# Patient Record
Sex: FEMALE | Race: WHITE | NOT HISPANIC OR LATINO | Employment: OTHER | URBAN - METROPOLITAN AREA
[De-identification: names, ages, dates, MRNs, and addresses within clinical notes are randomized per-mention and may not be internally consistent; named-entity substitution may affect disease eponyms.]

---

## 2023-05-08 DIAGNOSIS — N95.1 VASOMOTOR SYMPTOMS DUE TO MENOPAUSE: Primary | ICD-10-CM

## 2023-05-10 RX ORDER — ESTROGENS, CONJUGATED 0.3 MG/1
TABLET, FILM COATED ORAL
Qty: 15 TABLET | Refills: 1 | Status: SHIPPED | OUTPATIENT
Start: 2023-05-10 | End: 2024-01-07

## 2023-06-20 DIAGNOSIS — E78.5 HYPERLIPIDEMIA, UNSPECIFIED HYPERLIPIDEMIA TYPE: Primary | ICD-10-CM

## 2023-06-21 RX ORDER — ATORVASTATIN CALCIUM 20 MG/1
20 TABLET, FILM COATED ORAL NIGHTLY
Qty: 90 TABLET | Refills: 0 | Status: SHIPPED | OUTPATIENT
Start: 2023-06-21 | End: 2023-08-28 | Stop reason: DRUGHIGH

## 2023-06-27 DIAGNOSIS — E03.9 HYPOTHYROIDISM, UNSPECIFIED TYPE: Primary | ICD-10-CM

## 2023-06-28 RX ORDER — LEVOTHYROXINE SODIUM 100 UG/1
TABLET ORAL
Qty: 78 TABLET | Refills: 0 | Status: SHIPPED | OUTPATIENT
Start: 2023-06-28 | End: 2023-08-28 | Stop reason: ENTERED-IN-ERROR

## 2023-08-28 ENCOUNTER — OFFICE VISIT (OUTPATIENT)
Dept: PRIMARY CARE | Facility: CLINIC | Age: 83
End: 2023-08-28
Payer: MEDICARE

## 2023-08-28 VITALS
BODY MASS INDEX: 24.95 KG/M2 | OXYGEN SATURATION: 96 % | DIASTOLIC BLOOD PRESSURE: 80 MMHG | SYSTOLIC BLOOD PRESSURE: 134 MMHG | HEART RATE: 81 BPM | RESPIRATION RATE: 18 BRPM | HEIGHT: 62 IN | WEIGHT: 135.6 LBS

## 2023-08-28 DIAGNOSIS — H61.21 IMPACTED CERUMEN OF RIGHT EAR: ICD-10-CM

## 2023-08-28 DIAGNOSIS — Z78.0 MENOPAUSE: ICD-10-CM

## 2023-08-28 DIAGNOSIS — R13.10 DYSPHAGIA, UNSPECIFIED TYPE: ICD-10-CM

## 2023-08-28 DIAGNOSIS — M85.851 OSTEOPENIA OF RIGHT HIP: ICD-10-CM

## 2023-08-28 DIAGNOSIS — I10 HYPERTENSION, UNSPECIFIED TYPE: ICD-10-CM

## 2023-08-28 DIAGNOSIS — Z00.00 ENCOUNTER FOR ANNUAL WELLNESS EXAM IN MEDICARE PATIENT: Primary | ICD-10-CM

## 2023-08-28 DIAGNOSIS — N95.1 VASOMOTOR SYMPTOMS DUE TO MENOPAUSE: ICD-10-CM

## 2023-08-28 DIAGNOSIS — E03.9 HYPOTHYROIDISM, UNSPECIFIED TYPE: ICD-10-CM

## 2023-08-28 DIAGNOSIS — E78.5 HYPERLIPIDEMIA, UNSPECIFIED HYPERLIPIDEMIA TYPE: ICD-10-CM

## 2023-08-28 DIAGNOSIS — Z12.31 ENCOUNTER FOR SCREENING MAMMOGRAM FOR MALIGNANT NEOPLASM OF BREAST: ICD-10-CM

## 2023-08-28 PROCEDURE — G0439 PPPS, SUBSEQ VISIT: HCPCS | Performed by: SPECIALIST

## 2023-08-28 PROCEDURE — 1159F MED LIST DOCD IN RCRD: CPT | Performed by: SPECIALIST

## 2023-08-28 PROCEDURE — 99397 PER PM REEVAL EST PAT 65+ YR: CPT | Performed by: SPECIALIST

## 2023-08-28 PROCEDURE — 1036F TOBACCO NON-USER: CPT | Performed by: SPECIALIST

## 2023-08-28 PROCEDURE — 1125F AMNT PAIN NOTED PAIN PRSNT: CPT | Performed by: SPECIALIST

## 2023-08-28 PROCEDURE — 3079F DIAST BP 80-89 MM HG: CPT | Performed by: SPECIALIST

## 2023-08-28 PROCEDURE — 1157F ADVNC CARE PLAN IN RCRD: CPT | Performed by: SPECIALIST

## 2023-08-28 PROCEDURE — 1170F FXNL STATUS ASSESSED: CPT | Performed by: SPECIALIST

## 2023-08-28 PROCEDURE — 3075F SYST BP GE 130 - 139MM HG: CPT | Performed by: SPECIALIST

## 2023-08-28 PROCEDURE — 1160F RVW MEDS BY RX/DR IN RCRD: CPT | Performed by: SPECIALIST

## 2023-08-28 RX ORDER — ALENDRONATE SODIUM 70 MG/1
TABLET ORAL
COMMUNITY
Start: 2021-11-11 | End: 2023-11-28 | Stop reason: ALTCHOICE

## 2023-08-28 RX ORDER — VIT C/E/ZN/COPPR/LUTEIN/ZEAXAN 250MG-90MG
CAPSULE ORAL
COMMUNITY
Start: 2022-09-26 | End: 2023-11-28 | Stop reason: ALTCHOICE

## 2023-08-28 RX ORDER — LEVOTHYROXINE SODIUM 100 UG/1
100 TABLET ORAL SEE ADMIN INSTRUCTIONS
COMMUNITY
Start: 2016-12-13 | End: 2023-09-22 | Stop reason: DRUGHIGH

## 2023-08-28 RX ORDER — ACETAMINOPHEN 325 MG/1
TABLET ORAL EVERY 6 HOURS
COMMUNITY

## 2023-08-28 RX ORDER — ATORVASTATIN CALCIUM 20 MG/1
1 TABLET, FILM COATED ORAL NIGHTLY
COMMUNITY
Start: 2021-04-14 | End: 2023-12-04 | Stop reason: SDUPTHER

## 2023-08-28 RX ORDER — VIT A/VIT C/VIT E/ZINC/COPPER 2148-113
TABLET ORAL
COMMUNITY
Start: 2020-01-23

## 2023-08-28 RX ORDER — LISINOPRIL 10 MG/1
1 TABLET ORAL DAILY
COMMUNITY
Start: 2019-01-07 | End: 2023-10-28

## 2023-08-28 ASSESSMENT — ACTIVITIES OF DAILY LIVING (ADL)
GROCERY_SHOPPING: INDEPENDENT
MANAGING_FINANCES: INDEPENDENT
TAKING_MEDICATION: INDEPENDENT
BATHING: INDEPENDENT
DRESSING: INDEPENDENT
DOING_HOUSEWORK: INDEPENDENT

## 2023-08-28 ASSESSMENT — PATIENT HEALTH QUESTIONNAIRE - PHQ9
2. FEELING DOWN, DEPRESSED OR HOPELESS: NOT AT ALL
1. LITTLE INTEREST OR PLEASURE IN DOING THINGS: NOT AT ALL
SUM OF ALL RESPONSES TO PHQ9 QUESTIONS 1 AND 2: 0
1. LITTLE INTEREST OR PLEASURE IN DOING THINGS: NOT AT ALL
2. FEELING DOWN, DEPRESSED OR HOPELESS: NOT AT ALL
SUM OF ALL RESPONSES TO PHQ9 QUESTIONS 1 AND 2: 0

## 2023-08-28 ASSESSMENT — ENCOUNTER SYMPTOMS
OCCASIONAL FEELINGS OF UNSTEADINESS: 0
LOSS OF SENSATION IN FEET: 0
DEPRESSION: 0

## 2023-08-28 NOTE — PROGRESS NOTES
Subjective   Patient ID: Maryanne Gee is a 83 y.o. female who presents for Annual Exam.  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  Didn't go to ER.    Discussed, Last LDL 96, HDL 60 9/26/2022    Happy with new knees    Feet are bothering her, saw podiatry had xrays, arches are gone, has hammer toe on right and left and tendon rupture on bottom of right foot and calcified and left side of foot is numb and cannot move toes.  Has soft soles not much help, wearing Hoka sneakers which are very comfortable wants to avoid surgery (at [facility])    Gets ankle swelling some time fine in morning but by evening using compression hose some times.  Prior vein stripping    Now getting short of breath 2 flights of stairs no cp   New from last year, used to be able to walk up 6 flights now short of breath 2 flights.  Able to walk up one flight of stairs without PATTERSON.      Now living on 7th floor, walking on treadmill and does swimming does weights in water, only played golf once.  (Letitia Barraza was one of her foursome and Mara Cooper is no longer playing)    Not taking Alendronate weekly, is afraid of it, afraid of Osteonecrosis of the jaw and potential femur fracture.  Does not feel well on fosamax does not take every week started 11/11/2021 osteopenia on 7/2021 DXA, will repeat DXA, discussed risks/befefits of treatment, if worsening denisty could consider prolia but also risk of Osteonecrosis of Jaw    Episodic Dysphagia (feels she aspirates and coughs x 30 years rare)    Travelling to [place] and [place]     Allergies   Allergen Reactions  "   Tetanus And Diphtheria Toxoids Other     Arm swelling      Current Outpatient Medications   Medication Instructions    acetaminophen (TylenoL) 325 mg tablet oral, Every 6 hours    alendronate (Fosamax) 70 mg tablet oral    atorvastatin (Lipitor) 20 mg tablet 1 tablet, oral, Nightly, Mon Weds Friday and half tablet all other days    cholecalciferol (Vitamin D-3) 25 MCG (1000 UT) capsule oral    levothyroxine (SYNTHROID, LEVOXYL) 100 mcg, oral, See admin instructions, Takes one tablet MWF and half tablet all other days    lisinopril 10 mg tablet 1 tablet, oral, Daily    Premarin 0.3 mg tablet TAKE 1 TABLET BY MOUTH 1 DAY  WEEKLY    vitamins A,C,E-zinc-copper (PreserVision AREDS) 2,148 mcg-113 mg-45 mg-17.4mg tablet oral        Review of Systems  Constitutional  No fatigue, no fevers, no chills, no unintentional weight loss,   HEENT:  No headaches, no dizziness, Rare lightheadedness (less than once a month), macular degeneration has progressed, follows every 6 months, no double vision, no blurred vision, no hearing loss  Cardiovascular:  No chest pain, no palpitations, can have shortness of breath with exertion (one city block if walks too fast but not at her normal pace),   Respiratory:  No cough, no hemoptysis, no wheezing, No shortness of breath at rest  GI:  No odynophagia, no reflux, no abdominal pain, no nausea, no vomiting, no changes in bowel habits, no bright red blood per rectum, no melena  :  Positive episodic Dysphagia (feels she aspirates and coughs x 30 years rare) No urinary frequency, no dysuria, no urine incontinence nocturia 1-2x night with some urgency  MSK:  One fall, foot/ankle oint pain, no joint swelling  Neuro:  No tremors, no extremity weakness, lateral left foot numbness   Breasts:  No abnormalities on self breast exam no nipple discharge no skin changes    Physical Exam  /80   Pulse 81   Resp 18   Ht 1.575 m (5' 2\")   Wt 61.5 kg (135 lb 9.6 oz)   SpO2 96%   BMI 24.80 kg/m² "   General:    Well-appearing  F in no acute distress, well nourished, well hydrated  Head:  Normocephalic, atraumatic  Skin:          Warm dry,   Eyes:  Anicteric sclera, pupils equal,   Ears:        TMs intact on left, obscured by cerumen on right  Oral:      Not examined due to pandemic  Neck:   Supple, no cervical/supraclavicular adenopathy, no thyromegaly or nodules appreciated on exam  Cor:      Regular rate, normal S1, S2, no murmurs appreciated, no S3, no S4   Lungs:   Clear to auscultation b/l, no wheezes, no rhonchi, no crackles, no accessory respiratory muscle use  Abd:          Soft, nontender, no guarding, no rebound, no hepatosplenomegaly appreciated   Ext:            No lower extremity edema, no palpable cords  Pulses:      Pedal pulses intact  Neuro:   CN2-12 grossly intact   Breasts:     Not examined, declined    Assessment/Plan   Problem List Items Addressed This Visit       Encounter for annual wellness exam in Medicare patient - Primary     Continue annual influenza vaccine  Previoulsy received 2 covid vaccines 2 boosters and bivalent fall, 2022, plans covid vaccine this fall  Prior PPSV23 1/17/2019 and PCV 2016  Cannot do Td due to allergy  Recommend RSV vaccine   Previously received Shingrix vaccines at pharmacy  Prior HCV negative 8/29/2022  Labs ordered CMP CBC Fx Lipids TSH         Hypothyroidism     On Levothyroxine 100 mcgs daily  TSH 0.56 8/2022  Labs ordered TSH         Relevant Orders    Thyroid Stimulating Hormone    Hypertension     Home BP runs 120 or lower and diastolic under 80  Continue lisinopril 10 mg daily  Continue home blood pressure monitoring  Labs ordered CBC CMP           Relevant Orders    CBC    Comprehensive Metabolic Panel    Impacted cerumen of right ear     Referred to ENT         Relevant Orders    Referral to ENT    Dysphagia     Episodically feels as though she aspirates and coughs  Referred to ENT           Relevant Orders    Referral to ENT    Vasomotor symptoms  due to menopause     On Premarin         Hyperlipidemia     On Atorvastatin 20 mg daily 3 days per week and half tab all other days  LDL 96 9/26/2022  Labs ordered CMP Fx Lipids         Relevant Orders    Comprehensive Metabolic Panel    Lipid Panel    Osteopenia of right hip     Osteopenia on 7/19/2021 DXA  Not taking alendronate weekly since she does not feel well on it, initially ordered 11/11/2021, is afraid of Osteonecrosis of jaw and potential femur fracture  Bone density test ordered           Encounter for screening mammogram for malignant neoplasm of breast     Mammogram 9/8/2022, dense breast tissue  Mammogram ordered         Relevant Orders    BI mammo bilateral screening tomosynthesis    CBC    Comprehensive Metabolic Panel    Lipid Panel    Thyroid Stimulating Hormone    Menopause     Bone density test ordered  Osteopenia 11/11/2021 DXA           Relevant Orders    XR DEXA bone density     Follow-up 6 mos     Hortensia Pierre DO

## 2023-09-04 PROBLEM — M85.851 OSTEOPENIA OF RIGHT HIP: Status: ACTIVE | Noted: 2023-09-04

## 2023-09-04 PROBLEM — E03.9 HYPOTHYROIDISM: Status: ACTIVE | Noted: 2023-09-04

## 2023-09-04 PROBLEM — I10 HYPERTENSION: Status: ACTIVE | Noted: 2023-09-04

## 2023-09-04 PROBLEM — Z00.00 ENCOUNTER FOR ANNUAL WELLNESS EXAM IN MEDICARE PATIENT: Status: ACTIVE | Noted: 2023-09-04

## 2023-09-04 PROBLEM — Z12.31 ENCOUNTER FOR SCREENING MAMMOGRAM FOR MALIGNANT NEOPLASM OF BREAST: Status: ACTIVE | Noted: 2023-09-04

## 2023-09-04 PROBLEM — N95.1 VASOMOTOR SYMPTOMS DUE TO MENOPAUSE: Status: ACTIVE | Noted: 2023-09-04

## 2023-09-04 PROBLEM — H61.21 IMPACTED CERUMEN OF RIGHT EAR: Status: ACTIVE | Noted: 2023-09-04

## 2023-09-04 PROBLEM — R13.10 DYSPHAGIA: Status: ACTIVE | Noted: 2023-09-04

## 2023-09-04 PROBLEM — Z78.0 MENOPAUSE: Status: ACTIVE | Noted: 2023-09-04

## 2023-09-04 PROBLEM — M81.0 AGE-RELATED OSTEOPOROSIS WITHOUT CURRENT PATHOLOGICAL FRACTURE: Status: ACTIVE | Noted: 2023-09-04

## 2023-09-04 PROBLEM — E78.5 HYPERLIPIDEMIA: Status: ACTIVE | Noted: 2023-09-04

## 2023-09-04 NOTE — ASSESSMENT & PLAN NOTE
Osteopenia on 7/19/2021 DXA  Not taking alendronate weekly since she does not feel well on it, initially ordered 11/11/2021, is afraid of Osteonecrosis of jaw and potential femur fracture  Bone density test ordered

## 2023-09-04 NOTE — ASSESSMENT & PLAN NOTE
Continue annual influenza vaccine  Previoulsy received 2 covid vaccines 2 boosters and bivalent fall, 2022, plans covid vaccine this fall  Prior PPSV23 1/17/2019 and PCV 2016  Cannot do Td due to allergy  Recommend RSV vaccine   Previously received Shingrix vaccines at pharmacy  Prior HCV negative 8/29/2022  Labs ordered CMP CBC Fx Lipids TSH

## 2023-09-04 NOTE — ASSESSMENT & PLAN NOTE
On Atorvastatin 20 mg daily 3 days per week and half tab all other days  LDL 96 9/26/2022  Labs ordered CMP Fx Lipids

## 2023-09-04 NOTE — ASSESSMENT & PLAN NOTE
Home BP runs 120 or lower and diastolic under 80  Continue lisinopril 10 mg daily  Continue home blood pressure monitoring  Labs ordered CBC CMP

## 2023-09-13 ENCOUNTER — LAB (OUTPATIENT)
Dept: LAB | Facility: LAB | Age: 83
End: 2023-09-13
Payer: MEDICARE

## 2023-09-13 DIAGNOSIS — E78.5 HYPERLIPIDEMIA, UNSPECIFIED HYPERLIPIDEMIA TYPE: ICD-10-CM

## 2023-09-13 DIAGNOSIS — Z12.31 ENCOUNTER FOR SCREENING MAMMOGRAM FOR MALIGNANT NEOPLASM OF BREAST: ICD-10-CM

## 2023-09-13 DIAGNOSIS — I10 HYPERTENSION, UNSPECIFIED TYPE: ICD-10-CM

## 2023-09-13 DIAGNOSIS — E03.9 HYPOTHYROIDISM, UNSPECIFIED TYPE: ICD-10-CM

## 2023-09-13 PROCEDURE — 80053 COMPREHEN METABOLIC PANEL: CPT

## 2023-09-13 PROCEDURE — 85027 COMPLETE CBC AUTOMATED: CPT

## 2023-09-13 PROCEDURE — 84443 ASSAY THYROID STIM HORMONE: CPT

## 2023-09-13 PROCEDURE — 80061 LIPID PANEL: CPT

## 2023-09-13 PROCEDURE — 36415 COLL VENOUS BLD VENIPUNCTURE: CPT

## 2023-09-14 LAB
ALANINE AMINOTRANSFERASE (SGPT) (U/L) IN SER/PLAS: 14 U/L (ref 7–45)
ALBUMIN (G/DL) IN SER/PLAS: 4.2 G/DL (ref 3.4–5)
ALKALINE PHOSPHATASE (U/L) IN SER/PLAS: 62 U/L (ref 33–136)
ANION GAP IN SER/PLAS: 16 MMOL/L (ref 10–20)
ASPARTATE AMINOTRANSFERASE (SGOT) (U/L) IN SER/PLAS: 19 U/L (ref 9–39)
BILIRUBIN TOTAL (MG/DL) IN SER/PLAS: 0.4 MG/DL (ref 0–1.2)
CALCIUM (MG/DL) IN SER/PLAS: 10.2 MG/DL (ref 8.6–10.6)
CARBON DIOXIDE, TOTAL (MMOL/L) IN SER/PLAS: 26 MMOL/L (ref 21–32)
CHLORIDE (MMOL/L) IN SER/PLAS: 103 MMOL/L (ref 98–107)
CHOLESTEROL (MG/DL) IN SER/PLAS: 183 MG/DL (ref 0–199)
CHOLESTEROL IN HDL (MG/DL) IN SER/PLAS: 65.3 MG/DL
CHOLESTEROL/HDL RATIO: 2.8
CREATININE (MG/DL) IN SER/PLAS: 0.93 MG/DL (ref 0.5–1.05)
ERYTHROCYTE DISTRIBUTION WIDTH (RATIO) BY AUTOMATED COUNT: 13.7 % (ref 11.5–14.5)
ERYTHROCYTE MEAN CORPUSCULAR HEMOGLOBIN CONCENTRATION (G/DL) BY AUTOMATED: 31 G/DL (ref 32–36)
ERYTHROCYTE MEAN CORPUSCULAR VOLUME (FL) BY AUTOMATED COUNT: 97 FL (ref 80–100)
ERYTHROCYTES (10*6/UL) IN BLOOD BY AUTOMATED COUNT: 4.35 X10E12/L (ref 4–5.2)
GFR FEMALE: 60 ML/MIN/1.73M2
GLUCOSE (MG/DL) IN SER/PLAS: 81 MG/DL (ref 74–99)
HEMATOCRIT (%) IN BLOOD BY AUTOMATED COUNT: 42.3 % (ref 36–46)
HEMOGLOBIN (G/DL) IN BLOOD: 13.1 G/DL (ref 12–16)
LDL: 91 MG/DL (ref 0–99)
LEUKOCYTES (10*3/UL) IN BLOOD BY AUTOMATED COUNT: 10 X10E9/L (ref 4.4–11.3)
NRBC (PER 100 WBCS) BY AUTOMATED COUNT: 0 /100 WBC (ref 0–0)
PLATELETS (10*3/UL) IN BLOOD AUTOMATED COUNT: 309 X10E9/L (ref 150–450)
POTASSIUM (MMOL/L) IN SER/PLAS: 4.8 MMOL/L (ref 3.5–5.3)
PROTEIN TOTAL: 7.1 G/DL (ref 6.4–8.2)
SODIUM (MMOL/L) IN SER/PLAS: 140 MMOL/L (ref 136–145)
THYROTROPIN (MIU/L) IN SER/PLAS BY DETECTION LIMIT <= 0.05 MIU/L: 16.69 MIU/L (ref 0.44–3.98)
TRIGLYCERIDE (MG/DL) IN SER/PLAS: 136 MG/DL (ref 0–149)
UREA NITROGEN (MG/DL) IN SER/PLAS: 20 MG/DL (ref 6–23)
VLDL: 27 MG/DL (ref 0–40)

## 2023-09-22 DIAGNOSIS — E03.9 HYPOTHYROIDISM, UNSPECIFIED TYPE: Primary | ICD-10-CM

## 2023-09-22 RX ORDER — LEVOTHYROXINE SODIUM 100 UG/1
100 TABLET ORAL
COMMUNITY
End: 2023-12-04 | Stop reason: SDUPTHER

## 2023-09-26 PROBLEM — J34.89 SINUS PRESSURE: Status: ACTIVE | Noted: 2023-09-26

## 2023-09-26 PROBLEM — R82.90 ABNORMAL URINE FINDING: Status: ACTIVE | Noted: 2023-09-26

## 2023-09-26 PROBLEM — R15.1 FECAL SMEARING: Status: ACTIVE | Noted: 2023-09-26

## 2023-09-26 PROBLEM — M25.561 KNEE PAIN, RIGHT: Status: ACTIVE | Noted: 2023-09-26

## 2023-09-26 PROBLEM — M62.81 MUSCLE WEAKNESS (GENERALIZED): Status: ACTIVE | Noted: 2023-09-26

## 2023-09-26 PROBLEM — M19.079 ARTHRITIS OF FOOT: Status: ACTIVE | Noted: 2023-09-26

## 2023-09-26 PROBLEM — K64.8 PROLAPSED INTERNAL HEMORRHOIDS: Status: ACTIVE | Noted: 2023-09-26

## 2023-09-26 PROBLEM — R79.89 ABNORMAL TSH: Status: ACTIVE | Noted: 2023-09-26

## 2023-09-26 PROBLEM — F43.21 SITUATIONAL DEPRESSION: Status: ACTIVE | Noted: 2023-09-26

## 2023-09-26 PROBLEM — I87.8 VENOUS STASIS: Status: ACTIVE | Noted: 2023-09-26

## 2023-09-26 RX ORDER — ATORVASTATIN CALCIUM 10 MG/1
10 TABLET, FILM COATED ORAL NIGHTLY
COMMUNITY
End: 2024-02-05 | Stop reason: WASHOUT

## 2023-09-26 RX ORDER — OXYCODONE AND ACETAMINOPHEN 5; 325 MG/1; MG/1
1-2 TABLET ORAL EVERY 4 HOURS PRN
COMMUNITY
Start: 2018-02-14 | End: 2023-11-28 | Stop reason: ALTCHOICE

## 2023-09-26 RX ORDER — PREDNISONE 10 MG/1
TABLET ORAL
COMMUNITY
Start: 2017-10-02 | End: 2023-11-28 | Stop reason: ALTCHOICE

## 2023-09-26 RX ORDER — GLUCOSAMINE/CHONDROITIN/C/MANG 500-400 MG
1 CAPSULE ORAL 2 TIMES DAILY
COMMUNITY
Start: 2008-06-03 | End: 2023-11-28 | Stop reason: ALTCHOICE

## 2023-09-26 RX ORDER — DOCUSATE SODIUM 100 MG/1
100 CAPSULE, LIQUID FILLED ORAL 2 TIMES DAILY
COMMUNITY
Start: 2009-04-24 | End: 2023-11-28 | Stop reason: ALTCHOICE

## 2023-09-26 RX ORDER — LISINOPRIL 5 MG/1
5 TABLET ORAL DAILY
COMMUNITY
End: 2023-11-28 | Stop reason: ALTCHOICE

## 2023-09-26 RX ORDER — ONDANSETRON 4 MG/1
4 TABLET, ORALLY DISINTEGRATING ORAL EVERY 8 HOURS PRN
COMMUNITY
Start: 2018-02-14 | End: 2023-11-28 | Stop reason: ALTCHOICE

## 2023-09-26 RX ORDER — AZITHROMYCIN 250 MG/1
TABLET, FILM COATED ORAL
COMMUNITY
Start: 2017-10-02 | End: 2023-11-28 | Stop reason: ALTCHOICE

## 2023-09-26 RX ORDER — TRAMADOL HYDROCHLORIDE 50 MG/1
50 TABLET ORAL EVERY 6 HOURS PRN
COMMUNITY
Start: 2018-02-14 | End: 2023-11-28 | Stop reason: ALTCHOICE

## 2023-09-26 RX ORDER — CHOLECALCIFEROL (VITAMIN D3) 50 MCG
2000 TABLET ORAL DAILY
COMMUNITY
End: 2023-11-28 | Stop reason: ALTCHOICE

## 2023-09-26 RX ORDER — ALBUTEROL SULFATE 90 UG/1
AEROSOL, METERED RESPIRATORY (INHALATION)
COMMUNITY
Start: 2017-10-02 | End: 2023-11-28 | Stop reason: ALTCHOICE

## 2023-09-26 RX ORDER — MULTIVITAMIN
1 TABLET ORAL DAILY
COMMUNITY
Start: 2021-05-14 | End: 2023-11-28 | Stop reason: ALTCHOICE

## 2023-09-26 RX ORDER — ASPIRIN 81 MG/1
81 TABLET ORAL DAILY
COMMUNITY
Start: 2007-04-20 | End: 2023-11-28 | Stop reason: ALTCHOICE

## 2023-10-03 ENCOUNTER — APPOINTMENT (OUTPATIENT)
Dept: OTOLARYNGOLOGY | Facility: CLINIC | Age: 83
End: 2023-10-03
Payer: MEDICARE

## 2023-10-03 ENCOUNTER — APPOINTMENT (OUTPATIENT)
Dept: AUDIOLOGY | Facility: CLINIC | Age: 83
End: 2023-10-03
Payer: MEDICARE

## 2023-10-27 DIAGNOSIS — I10 PRIMARY HYPERTENSION: ICD-10-CM

## 2023-10-28 RX ORDER — LISINOPRIL 10 MG/1
10 TABLET ORAL DAILY
Qty: 100 TABLET | Refills: 1 | Status: SHIPPED | OUTPATIENT
Start: 2023-10-28 | End: 2024-03-22

## 2023-11-02 ENCOUNTER — HOSPITAL ENCOUNTER (OUTPATIENT)
Dept: RADIOLOGY | Facility: EXTERNAL LOCATION | Age: 83
Discharge: HOME | End: 2023-11-02
Payer: MEDICARE

## 2023-11-02 DIAGNOSIS — M25.531 RIGHT WRIST PAIN: ICD-10-CM

## 2023-11-09 ENCOUNTER — LAB (OUTPATIENT)
Dept: LAB | Facility: LAB | Age: 83
End: 2023-11-09
Payer: MEDICARE

## 2023-11-09 DIAGNOSIS — E03.9 HYPOTHYROIDISM, UNSPECIFIED TYPE: ICD-10-CM

## 2023-11-09 LAB — TSH SERPL-ACNC: 2.22 MIU/L (ref 0.44–3.98)

## 2023-11-09 PROCEDURE — 84443 ASSAY THYROID STIM HORMONE: CPT

## 2023-11-09 PROCEDURE — 36415 COLL VENOUS BLD VENIPUNCTURE: CPT

## 2023-11-28 ENCOUNTER — OFFICE VISIT (OUTPATIENT)
Dept: OTOLARYNGOLOGY | Facility: CLINIC | Age: 83
End: 2023-11-28
Payer: MEDICARE

## 2023-11-28 VITALS — BODY MASS INDEX: 24.73 KG/M2 | WEIGHT: 135.2 LBS

## 2023-11-28 DIAGNOSIS — R05.3 CHRONIC COUGH: Primary | ICD-10-CM

## 2023-11-28 DIAGNOSIS — J38.3 GLOTTIC INSUFFICIENCY: ICD-10-CM

## 2023-11-28 DIAGNOSIS — R13.12 OROPHARYNGEAL DYSPHAGIA: ICD-10-CM

## 2023-11-28 DIAGNOSIS — J38.02 BILATERAL VOCAL CORD PARESIS: ICD-10-CM

## 2023-11-28 DIAGNOSIS — J38.5 LARYNGOSPASM: ICD-10-CM

## 2023-11-28 PROCEDURE — 1160F RVW MEDS BY RX/DR IN RCRD: CPT | Performed by: OTOLARYNGOLOGY

## 2023-11-28 PROCEDURE — 1125F AMNT PAIN NOTED PAIN PRSNT: CPT | Performed by: OTOLARYNGOLOGY

## 2023-11-28 PROCEDURE — 1036F TOBACCO NON-USER: CPT | Performed by: OTOLARYNGOLOGY

## 2023-11-28 PROCEDURE — 31579 LARYNGOSCOPY TELESCOPIC: CPT | Performed by: OTOLARYNGOLOGY

## 2023-11-28 PROCEDURE — 99204 OFFICE O/P NEW MOD 45 MIN: CPT | Performed by: OTOLARYNGOLOGY

## 2023-11-28 PROCEDURE — 1159F MED LIST DOCD IN RCRD: CPT | Performed by: OTOLARYNGOLOGY

## 2023-11-28 ASSESSMENT — PATIENT HEALTH QUESTIONNAIRE - PHQ9: 2. FEELING DOWN, DEPRESSED OR HOPELESS: NOT AT ALL

## 2023-11-28 NOTE — PROGRESS NOTES
Reason For Consult  Chief Complaint   Patient presents with    Dysphagia        HISTORY OF PRESENT ILLNESS:  This is a request for consultation from [facility] for Maryanne Gee, who is a 83 y.o. female presenting for an initial visit for dysphagia.      The patient reports a history of aspiration with water and secretions. This is sporadic and happens at least 5 times a year. These episodes trigger severe coughing fits. The patient reports that this is bothersome for her as she lives alone. No syncopal episodes with the cough. She reports careful swallow, chews thoroughly, and takes her a long time to eat her meals. She is scheduled for an esophagram. She has a history of tonsillectomy with hemorrhaging in childhood and wants to know if this is contributory.    No changes to voice or breathing.         Past Medical History  Hypothyroidism, dysphagia, HTN, osteopenia, depression.     Surgical History  She has a past surgical history that includes Hysterectomy (03/04/2013); Tubal ligation (03/04/2013); Knee Arthroplasty (06/26/2020); Other surgical history (09/26/2022); Other surgical history (01/06/2020); Other surgical history (01/06/2020); Hernia repair (08/26/2022); Other surgical history (05/14/2021); Knee Arthroplasty (04/17/2013); and Breast cyst aspiration.     Social History  She reports that she quit smoking about 53 years ago. Her smoking use included cigarettes. She has a 20.00 pack-year smoking history. She has never used smokeless tobacco. She reports current alcohol use of about 2.0 standard drinks of alcohol per week. She reports that she does not use drugs.    Allergies  Tetanus and diphtheria toxoids    Review of Systems  All 10 systems were reviewed and negative except for above.      Physical Exam  CONSTITUTIONAL: Well developed, well nourished.    VOICE: Normal   RESPIRATION: Breathing comfortably, no stridor.    NEURO: Alert and oriented x3, cranial nerves II-XII intact and symmetric bilaterally.     EARS: Normal external ears, external auditory canals, normal hearing to conversational voice.    NOSE: External nose midline, anterior rhinoscopy is normal with limited visualization to the anterior aspect of the interior turbinates. No lesions noted.     ORAL CAVITY/OROPHARYNX/LIPS: Normal mucous membranes, normal floor of mouth/tongue/OP, no masses or lesions are noted.    SKIN: Neck skin is without scar or injury.    PSYCH: Alert and oriented with appropriate mood and affect.        Last Recorded Vitals  Weight 61.3 kg (135 lb 3.2 oz).    Procedure  PROCEDURE NOTE:  Recommended stroboscopy.  Risks, benefits,  and alternatives were explained.  They wish to proceed and provide verbal consent.     PROCEDURE: Flexible laryngoscopy with stroboscopy, CPT 35837     POSTPROCEDURE DIAGNOSIS: cough    INDICATIONS: Inability to tolerate mirror exam or abnormal findings on mirror, Stroboscopy performed to assess one of the followin. Diagnosis of symptomatic disorder involving the voice, swallow, upper aerodigestive tract, including VISHNU disorders, or  2. Preoperative evaluation of vocal cord function for individuals undergoing surgery where the RLN or vagus nerves are at risk of injury, or  3. Further evaluation of abnormalities of the upper aerodigestive tract discovered by another modality, such as CT, MRI, bronchoscopy or EGD    Description of Procedure:    After adequate afrin and lidocaine spray, I advanced the endoscope.  Visualization of the nasopharynx, vallecula, posterior pharyngeal walls, pyriform, epiglottis and post cricoid areas was unremarkable.  The following laryngeal findings were noted:    vocal cord movement was normal  closure was incomplete with a mid glottal gap  Mucosal wave was increased bilaterally, right > left   Overhanging right arytenoid    Compression was increased AP> FVC, right > left  lesions were none   the subglottis was widely patent  Pharyngeal wall squeeze was decreased on the  right     Procedure well tolerated.       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    Secondary issues identified and managed on this patient visit include: decreased right pharyngeal wall squeeze    Diagnoses are exacerbated by: ?surgical changes s/p tonsillectomy with weaker right pharyngeal wall, although idiopathic is     We discussed the treatment options to include, medical and surgical options.  We have decided to proceed as follows:   We will order a MBS to evaluate her swallow. She will undergo her esophagram as scheduled. She will perform swallow strategies to include chin tuck and head turn to the right to prevent incidences of aspiration or choking.  She will work with speech therapy to work on safe swallow strategies and laryngospasm recovery strategies.    All her questions were answered.    Scribe Attestation  By signing my name below, ICari , Scribe attest that this documentation has been prepared under the direction and in the presence of Naomie Woods MD.

## 2023-12-04 DIAGNOSIS — E78.49 OTHER HYPERLIPIDEMIA: ICD-10-CM

## 2023-12-04 DIAGNOSIS — E03.9 HYPOTHYROIDISM, UNSPECIFIED TYPE: ICD-10-CM

## 2023-12-04 RX ORDER — LEVOTHYROXINE SODIUM 100 UG/1
100 TABLET ORAL
Qty: 90 TABLET | Refills: 0 | Status: SHIPPED | OUTPATIENT
Start: 2023-12-04 | End: 2024-02-05

## 2023-12-04 RX ORDER — ATORVASTATIN CALCIUM 20 MG/1
20 TABLET, FILM COATED ORAL NIGHTLY
Qty: 90 TABLET | Refills: 0 | Status: SHIPPED | OUTPATIENT
Start: 2023-12-04 | End: 2024-02-05

## 2023-12-26 ENCOUNTER — ANCILLARY PROCEDURE (OUTPATIENT)
Dept: RADIOLOGY | Facility: CLINIC | Age: 83
End: 2023-12-26
Payer: MEDICARE

## 2023-12-26 DIAGNOSIS — Z78.0 ASYMPTOMATIC MENOPAUSAL STATE: ICD-10-CM

## 2023-12-26 PROCEDURE — 77080 DXA BONE DENSITY AXIAL: CPT

## 2023-12-26 PROCEDURE — 77080 DXA BONE DENSITY AXIAL: CPT | Performed by: RADIOLOGY

## 2024-01-03 ENCOUNTER — HOSPITAL ENCOUNTER (OUTPATIENT)
Dept: RADIOLOGY | Facility: HOSPITAL | Age: 84
Discharge: HOME | End: 2024-01-03
Payer: MEDICARE

## 2024-01-03 DIAGNOSIS — R13.12 OROPHARYNGEAL DYSPHAGIA: ICD-10-CM

## 2024-01-03 PROCEDURE — 2500000001 HC RX 250 WO HCPCS SELF ADMINISTERED DRUGS (ALT 637 FOR MEDICARE OP): Performed by: OTOLARYNGOLOGY

## 2024-01-03 PROCEDURE — 92611 MOTION FLUOROSCOPY/SWALLOW: CPT | Mod: GN

## 2024-01-03 PROCEDURE — 2500000005 HC RX 250 GENERAL PHARMACY W/O HCPCS: Performed by: OTOLARYNGOLOGY

## 2024-01-03 PROCEDURE — 74230 X-RAY XM SWLNG FUNCJ C+: CPT

## 2024-01-03 PROCEDURE — 74230 X-RAY XM SWLNG FUNCJ C+: CPT | Performed by: RADIOLOGY

## 2024-01-03 RX ADMIN — BARIUM SULFATE 20 ML: 0.81 POWDER, FOR SUSPENSION ORAL at 12:10

## 2024-01-03 RX ADMIN — BARIUM SULFATE 20 ML: 400 SUSPENSION ORAL at 12:11

## 2024-01-03 RX ADMIN — BARIUM SULFATE 20 ML: 400 SUSPENSION ORAL at 12:12

## 2024-01-03 RX ADMIN — BARIUM SULFATE 20 ML: 400 PASTE ORAL at 12:09

## 2024-01-04 DIAGNOSIS — N95.1 VASOMOTOR SYMPTOMS DUE TO MENOPAUSE: ICD-10-CM

## 2024-01-07 RX ORDER — ESTROGENS, CONJUGATED 0.3 MG/1
0.3 TABLET, FILM COATED ORAL
Qty: 15 TABLET | Refills: 0 | Status: SHIPPED | OUTPATIENT
Start: 2024-01-07 | End: 2024-04-12

## 2024-01-09 NOTE — ADDENDUM NOTE
Encounter addended by: Anna Marie Lamas, CCC-SLP on: 1/9/2024 2:39 PM   Actions taken: SmartForm saved

## 2024-01-10 ENCOUNTER — CLINICAL SUPPORT (OUTPATIENT)
Dept: SPEECH THERAPY | Facility: CLINIC | Age: 84
End: 2024-01-10
Payer: MEDICARE

## 2024-01-10 DIAGNOSIS — J38.5 LARYNGOSPASM: ICD-10-CM

## 2024-01-10 DIAGNOSIS — R05.3 CHRONIC COUGH: ICD-10-CM

## 2024-01-10 DIAGNOSIS — R13.14 PHARYNGOESOPHAGEAL DYSPHAGIA: Primary | ICD-10-CM

## 2024-01-10 DIAGNOSIS — R13.12 OROPHARYNGEAL DYSPHAGIA: ICD-10-CM

## 2024-01-10 PROCEDURE — 92526 ORAL FUNCTION THERAPY: CPT | Mod: GN

## 2024-01-10 ASSESSMENT — PAIN - FUNCTIONAL ASSESSMENT: PAIN_FUNCTIONAL_ASSESSMENT: 0-10

## 2024-01-10 ASSESSMENT — PAIN SCALES - GENERAL: PAINLEVEL_OUTOF10: 0 - NO PAIN

## 2024-01-10 NOTE — PROGRESS NOTES
Speech-Language Pathology    Outpatient Speech-Language Pathology Treatment     Patient Name: Maryanne Gee  MRN: [id number]  Today's Date: 1/10/2024     Time Calculation  Start Time: 1300  Stop Time: 1325  Time Calculation (min): 25 min      Current Problem:   1. Pharyngoesophageal dysphagia        2. Laryngospasm          Pain Assessment:  Pain Assessment: 0-10  Pain Score: 0 - No pain    SUBJECTIVE  Patient alert and oriented and ready to participate in office visit this date.    OBJECTIVE  LONG TERM GOAL  Improve ability to tolerate the least restrictive diet without signs/symptoms of aspiration.    SHORT TERM GOALS  Patient will demonstrate independent use of swallow techniques x 80% accuracy.  Patient will demonstrate independent use of breathing techniques x 80% accuracy.    ASSESSMENT  The patient completed a MBSS on 1/3/2024. Results indicated a functional oropharyngeal swallow with recommendations for a regular diet with thin liquids and safe swallow guidelines. A CP impression was identified, but without bolus holdup.     Instructed patient universal safe swallow guidelines including small bites/sips, slow PO intake and alternate consistencies as needed. The patient reports that several times a year she has a laryngospasm during PO intake. Instructed in RTB technique to restore easy breathing in the event of a laryngospasm. Patient encouraged to sit first and complete the breathing strategy until easy breathing is restored. Discussed that following the reviewed safe swallow guidelines reduces the risk of triggered a laryngospasm. The laryngospasms may be related to her mild CP impression.    Handouts provided to facilitate accurate home carryover.     PLAN  Continue current plan of care (follow up as needed based on home progress)  Patient to follow up with physician as needed   Progress with POC, as tolerated  Discussed POC with patient  Patient/caregiver agreeable with POC

## 2024-01-12 DIAGNOSIS — M85.851 OSTEOPENIA OF RIGHT HIP: ICD-10-CM

## 2024-01-12 RX ORDER — ALENDRONATE SODIUM 70 MG/1
70 TABLET ORAL
COMMUNITY
Start: 2021-11-11 | End: 2024-01-12 | Stop reason: SDUPTHER

## 2024-01-15 RX ORDER — ALENDRONATE SODIUM 70 MG/1
70 TABLET ORAL
Qty: 12 TABLET | Refills: 1 | Status: SHIPPED | OUTPATIENT
Start: 2024-01-15 | End: 2024-05-31

## 2024-01-19 ENCOUNTER — PATIENT OUTREACH (OUTPATIENT)
Dept: PRIMARY CARE | Facility: CLINIC | Age: 84
End: 2024-01-19
Payer: MEDICARE

## 2024-01-19 ENCOUNTER — DOCUMENTATION (OUTPATIENT)
Dept: PRIMARY CARE | Facility: CLINIC | Age: 84
End: 2024-01-19
Payer: MEDICARE

## 2024-01-19 NOTE — PROGRESS NOTES
Discharge Facility: [facility]   Discharge Diagnosis: Diarrhea   Admission Date: 1/17/24  Discharge Date: 1/18/24    PCP Appointment Date: none available. Task to office  Specialist Appointment Date:  Hospital Encounter and Summary: Linked   See discharge assessment below for further details    Engagement  Call Start Time: 0948 (1/19/2024  9:52 AM)    Medications  Medications reviewed with patient/caregiver?: Yes (1/19/2024  9:52 AM)  Is the patient having any side effects they believe may be caused by any medication additions or changes?: No (1/19/2024  9:52 AM)  Does the patient have all medications ordered at discharge?: Not applicable (1/19/2024  9:52 AM)  Care Management Interventions: No intervention needed (1/19/2024  9:52 AM)  Prescription Comments: n/a (1/19/2024  9:52 AM)  Is the patient taking all medications as directed (includes completed medication regime)?: Not applicable (1/19/2024  9:52 AM)  Medication Comments: n/a (1/19/2024  9:52 AM)    Appointments  Does the patient have a primary care provider?: Yes (1/19/2024  9:52 AM)  Care Management Interventions: -- (no available appt. task to office) (1/19/2024  9:52 AM)  Has the patient kept scheduled appointments due by today?: Yes (1/19/2024  9:52 AM)  Care Management Interventions: Educated on importance of keeping appointment (1/19/2024  9:52 AM)    Patient Teaching  Does the patient have access to their discharge instructions?: Yes (1/19/2024  9:52 AM)  Care Management Interventions: Reviewed instructions with patient (1/19/2024  9:52 AM)  What is the patient's perception of their health status since discharge?: Improving (1/19/2024  9:52 AM)  Is the patient/caregiver able to teach back the hierarchy of who to call/visit for symptoms/problems? PCP, Specialist, Home Health nurse, Urgent Care, ED, 911: Yes (1/19/2024  9:52 AM)      Emerson Guzman LPN

## 2024-01-30 ENCOUNTER — PATIENT OUTREACH (OUTPATIENT)
Dept: PRIMARY CARE | Facility: CLINIC | Age: 84
End: 2024-01-30
Payer: MEDICARE

## 2024-01-30 NOTE — PROGRESS NOTES
Call regarding appt. with PCP on (N/A) after hospitalization.  At time of outreach call the patient feels as if their condition has (improved) since last visit.  Reviewed the PCP appointment with the pt and addressed any questions or concerns.     Emerson Guzman LPN

## 2024-02-03 DIAGNOSIS — E03.9 HYPOTHYROIDISM, UNSPECIFIED TYPE: ICD-10-CM

## 2024-02-04 DIAGNOSIS — E78.49 OTHER HYPERLIPIDEMIA: ICD-10-CM

## 2024-02-05 ENCOUNTER — OFFICE VISIT (OUTPATIENT)
Dept: PRIMARY CARE | Facility: CLINIC | Age: 84
End: 2024-02-05
Payer: MEDICARE

## 2024-02-05 ENCOUNTER — HOSPITAL ENCOUNTER (OUTPATIENT)
Dept: RADIOLOGY | Facility: CLINIC | Age: 84
Discharge: HOME | End: 2024-02-05
Payer: MEDICARE

## 2024-02-05 ENCOUNTER — LAB (OUTPATIENT)
Dept: LAB | Facility: LAB | Age: 84
End: 2024-02-05
Payer: MEDICARE

## 2024-02-05 VITALS
DIASTOLIC BLOOD PRESSURE: 78 MMHG | SYSTOLIC BLOOD PRESSURE: 136 MMHG | WEIGHT: 134 LBS | HEIGHT: 62 IN | BODY MASS INDEX: 24.66 KG/M2

## 2024-02-05 DIAGNOSIS — I10 HYPERTENSION, UNSPECIFIED TYPE: ICD-10-CM

## 2024-02-05 DIAGNOSIS — E78.5 HYPERLIPIDEMIA, UNSPECIFIED HYPERLIPIDEMIA TYPE: ICD-10-CM

## 2024-02-05 DIAGNOSIS — E03.9 HYPOTHYROIDISM, UNSPECIFIED TYPE: Primary | ICD-10-CM

## 2024-02-05 DIAGNOSIS — E03.9 HYPOTHYROIDISM, UNSPECIFIED TYPE: ICD-10-CM

## 2024-02-05 DIAGNOSIS — M89.8X5 PAIN OF LEFT FEMUR: ICD-10-CM

## 2024-02-05 DIAGNOSIS — I70.203 ATHEROSCLEROSIS OF NATIVE ARTERY OF BOTH LOWER EXTREMITIES, WITH UNSPECIFIED PRESENCE OF CLINICAL MANIFESTATION (CMS-HCC): ICD-10-CM

## 2024-02-05 LAB — TSH SERPL-ACNC: 5.34 MIU/L (ref 0.44–3.98)

## 2024-02-05 PROCEDURE — 3075F SYST BP GE 130 - 139MM HG: CPT | Performed by: SPECIALIST

## 2024-02-05 PROCEDURE — 1157F ADVNC CARE PLAN IN RCRD: CPT | Performed by: SPECIALIST

## 2024-02-05 PROCEDURE — 3078F DIAST BP <80 MM HG: CPT | Performed by: SPECIALIST

## 2024-02-05 PROCEDURE — 73552 X-RAY EXAM OF FEMUR 2/>: CPT | Mod: LEFT SIDE | Performed by: RADIOLOGY

## 2024-02-05 PROCEDURE — 84443 ASSAY THYROID STIM HORMONE: CPT

## 2024-02-05 PROCEDURE — 36415 COLL VENOUS BLD VENIPUNCTURE: CPT

## 2024-02-05 PROCEDURE — 80053 COMPREHEN METABOLIC PANEL: CPT

## 2024-02-05 PROCEDURE — 99214 OFFICE O/P EST MOD 30 MIN: CPT | Performed by: SPECIALIST

## 2024-02-05 PROCEDURE — 1159F MED LIST DOCD IN RCRD: CPT | Performed by: SPECIALIST

## 2024-02-05 PROCEDURE — 1036F TOBACCO NON-USER: CPT | Performed by: SPECIALIST

## 2024-02-05 PROCEDURE — 1125F AMNT PAIN NOTED PAIN PRSNT: CPT | Performed by: SPECIALIST

## 2024-02-05 PROCEDURE — 1160F RVW MEDS BY RX/DR IN RCRD: CPT | Performed by: SPECIALIST

## 2024-02-05 PROCEDURE — 73552 X-RAY EXAM OF FEMUR 2/>: CPT | Mod: LT

## 2024-02-05 RX ORDER — IBUPROFEN 200 MG
200 TABLET ORAL EVERY 6 HOURS PRN
COMMUNITY

## 2024-02-05 RX ORDER — LEVOTHYROXINE SODIUM 100 UG/1
TABLET ORAL
Qty: 90 TABLET | Refills: 0 | Status: SHIPPED | OUTPATIENT
Start: 2024-02-05 | End: 2024-04-12

## 2024-02-05 RX ORDER — ATORVASTATIN CALCIUM 20 MG/1
20 TABLET, FILM COATED ORAL NIGHTLY
Qty: 90 TABLET | Refills: 0 | Status: SHIPPED | OUTPATIENT
Start: 2024-02-05 | End: 2024-04-12

## 2024-02-05 NOTE — PROGRESS NOTES
Subjective   Patient ID: Maryanne Gee is a 83 y.o. female who presents for No chief complaint on file..  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    Left femur pain ache and small soft tissue nodule     Did speech therapy no dysphagia last visit    Allergies   Allergen Reactions    Tetanus And Diphtheria Toxoids Other     Arm swelling      Current Outpatient Medications   Medication Instructions    acetaminophen (TylenoL) 325 mg tablet oral, Every 6 hours    alendronate (FOSAMAX) 70 mg, oral, Every 7 days    atorvastatin (LIPITOR) 20 mg, oral, Nightly    cholecalciferol-soy isoflavone 2,000-64 unit-mg tablet oral, Every other day    ibuprofen 200 mg, oral, Every 6 hours PRN    levothyroxine (Synthroid, Levoxyl) 100 mcg tablet TAKE 1 TABLET BY MOUTH ONCE  DAILY IN THE MORNING BEFORE  MEALS 6 DAYS PER WEEK AND  ONE-HALF TABLET BY MOUTH 1 DAY  WEEKLY    lisinopril 10 mg, oral, Daily    Premarin 0.3 mg, oral, Weekly    vitamins A,C,E-zinc-copper (PreserVision AREDS)  "2,148 mcg-113 mg-45 mg-17.4mg tablet oral        Review of Systems  Constitutional  No fatigue, no fevers, no chills, no unintentional weight loss,   HEENT:  No headaches, no dizziness  Cardiovascular:  No chest pain, no palpitations, No PATTERSON unless unless really fast  Respiratory:  No cough,  No shortness of breath,  GI:  No dysphagia, no abdominal pain, no nausea, no vomiting, no changes in bowel habits, no bright red blood per rectum, no melena  MSK:  Left femur hurts and feet hurt, No falls, tiny soft tissue nodule in thigh, ankle swelling in evening  Neuro:  No extremity weakness, no changes in sensation    Physical Exam  /78   Ht 1.57 m (5' 1.81\")   Wt 60.8 kg (134 lb)   BMI 24.66 kg/m²   General:    Well-appearing  F in no acute distress, well nourished, well hydrated  Head:  Normocephalic, atraumatic  Skin:          Warm dry,   Eyes:  Anicteric sclera, pupils equal,   Oral:      Not examined due to pandemic  Neck:   Supple,  Cor:      Regular rate, normal S1, S2, no murmurs appreciated, no S3, no S4   Lungs:   Clear to auscultation b/l, no wheezes, no rhonchi, no crackles, no accessory respiratory muscle use  Abd:          Soft, nontender, no guarding, no rebound, no hepatosplenomegaly appreciated     Assessment/Plan   Problem List Items Addressed This Visit       Hypothyroidism - Primary     On Levothyroxine 100 mcgs daily 6 days per week, one-half tablet 1 day per week         Relevant Orders    TSH (Completed)    Hypertension     Continue home BP monitoring  BP today 136/78  Continue lisinopril 10 mg daily         Relevant Orders    Comprehensive Metabolic Panel (Completed)    Hyperlipidemia     To take atorvastatin 20 mg 4 days per week, half tablet 3 days per week  Labs ordered CMP         Relevant Orders    Comprehensive Metabolic Panel (Completed)    Atherosclerosis of native artery of both lower extremities, with unspecified presence of clinical manifestation (CMS/HCC)    Pain of left femur "     Unclear etiology  Daily aching pain  Xray ordered         Relevant Orders    XR femur left 2+ views (Completed)     Previously received Covid, Influenza and RSV vaccines  MAW due 6/27/24       Hortensia Pierre DO

## 2024-02-06 LAB
ALBUMIN SERPL BCP-MCNC: 4.1 G/DL (ref 3.4–5)
ALP SERPL-CCNC: 64 U/L (ref 33–136)
ALT SERPL W P-5'-P-CCNC: 17 U/L (ref 7–45)
ANION GAP SERPL CALC-SCNC: 14 MMOL/L (ref 10–20)
AST SERPL W P-5'-P-CCNC: 19 U/L (ref 9–39)
BILIRUB SERPL-MCNC: 0.5 MG/DL (ref 0–1.2)
BUN SERPL-MCNC: 15 MG/DL (ref 6–23)
CALCIUM SERPL-MCNC: 9.7 MG/DL (ref 8.6–10.6)
CHLORIDE SERPL-SCNC: 105 MMOL/L (ref 98–107)
CO2 SERPL-SCNC: 25 MMOL/L (ref 21–32)
CREAT SERPL-MCNC: 0.85 MG/DL (ref 0.5–1.05)
EGFRCR SERPLBLD CKD-EPI 2021: 68 ML/MIN/1.73M*2
GLUCOSE SERPL-MCNC: 88 MG/DL (ref 74–99)
POTASSIUM SERPL-SCNC: 4.5 MMOL/L (ref 3.5–5.3)
PROT SERPL-MCNC: 6.9 G/DL (ref 6.4–8.2)
SODIUM SERPL-SCNC: 139 MMOL/L (ref 136–145)

## 2024-02-15 PROBLEM — M89.8X5 PAIN OF LEFT FEMUR: Status: ACTIVE | Noted: 2024-02-15

## 2024-02-16 ENCOUNTER — PATIENT OUTREACH (OUTPATIENT)
Dept: PRIMARY CARE | Facility: CLINIC | Age: 84
End: 2024-02-16
Payer: MEDICARE

## 2024-02-16 NOTE — PROGRESS NOTES
Call placed regarding one month post discharge follow up call.  At time of outreach call the patient feels as if their condition has (improved) since initial visit with PCP or specialist.  Questions or concerns regarding recovery period addressed at this time. (Individualize as needed if questions arise)  Reviewed any PCP or specialists progress notes/labs/radiology reports if applicable and addressed any questions or concerns.      Emerson Guzman LPN

## 2024-02-28 ENCOUNTER — APPOINTMENT (OUTPATIENT)
Dept: PRIMARY CARE | Facility: CLINIC | Age: 84
End: 2024-02-28
Payer: MEDICARE

## 2024-02-28 DIAGNOSIS — E03.9 HYPOTHYROIDISM, UNSPECIFIED TYPE: Primary | ICD-10-CM

## 2024-03-21 DIAGNOSIS — I10 PRIMARY HYPERTENSION: ICD-10-CM

## 2024-03-22 RX ORDER — LISINOPRIL 10 MG/1
10 TABLET ORAL DAILY
Qty: 100 TABLET | Refills: 1 | Status: SHIPPED | OUTPATIENT
Start: 2024-03-22

## 2024-04-10 DIAGNOSIS — E78.49 OTHER HYPERLIPIDEMIA: ICD-10-CM

## 2024-04-10 DIAGNOSIS — N95.1 VASOMOTOR SYMPTOMS DUE TO MENOPAUSE: ICD-10-CM

## 2024-04-10 DIAGNOSIS — E03.9 HYPOTHYROIDISM, UNSPECIFIED TYPE: ICD-10-CM

## 2024-04-12 RX ORDER — ATORVASTATIN CALCIUM 20 MG/1
20 TABLET, FILM COATED ORAL NIGHTLY
Qty: 90 TABLET | Refills: 1 | Status: SHIPPED | OUTPATIENT
Start: 2024-04-12

## 2024-04-12 RX ORDER — LEVOTHYROXINE SODIUM 100 UG/1
TABLET ORAL
Qty: 90 TABLET | Refills: 1 | Status: SHIPPED | OUTPATIENT
Start: 2024-04-12

## 2024-04-15 ENCOUNTER — PATIENT OUTREACH (OUTPATIENT)
Dept: PRIMARY CARE | Facility: CLINIC | Age: 84
End: 2024-04-15
Payer: MEDICARE

## 2024-04-15 NOTE — PROGRESS NOTES
Patient has met target of no readmission for (90) days post (hospital, SNF, rehab) discharge and is graduated from Transitional Care Management program at this time.     Emerson Guzman LPN

## 2024-05-21 ENCOUNTER — LAB (OUTPATIENT)
Dept: LAB | Facility: LAB | Age: 84
End: 2024-05-21
Payer: MEDICARE

## 2024-05-21 DIAGNOSIS — E03.9 HYPOTHYROIDISM, UNSPECIFIED TYPE: ICD-10-CM

## 2024-05-21 LAB — TSH SERPL-ACNC: 1.38 MIU/L (ref 0.44–3.98)

## 2024-05-21 PROCEDURE — 84443 ASSAY THYROID STIM HORMONE: CPT

## 2024-05-21 PROCEDURE — 36415 COLL VENOUS BLD VENIPUNCTURE: CPT

## 2024-05-30 DIAGNOSIS — M85.851 OSTEOPENIA OF RIGHT HIP: ICD-10-CM

## 2024-05-31 RX ORDER — ALENDRONATE SODIUM 70 MG/1
TABLET ORAL
Qty: 12 TABLET | Refills: 0 | Status: SHIPPED | OUTPATIENT
Start: 2024-05-31

## 2024-08-05 ENCOUNTER — APPOINTMENT (OUTPATIENT)
Dept: PRIMARY CARE | Facility: CLINIC | Age: 84
End: 2024-08-05
Payer: MEDICARE

## 2024-08-05 VITALS
SYSTOLIC BLOOD PRESSURE: 138 MMHG | OXYGEN SATURATION: 94 % | HEIGHT: 62 IN | DIASTOLIC BLOOD PRESSURE: 72 MMHG | HEART RATE: 81 BPM | WEIGHT: 135.8 LBS | BODY MASS INDEX: 24.99 KG/M2

## 2024-08-05 DIAGNOSIS — R06.09 DOE (DYSPNEA ON EXERTION): ICD-10-CM

## 2024-08-05 DIAGNOSIS — R00.2 PALPITATION: ICD-10-CM

## 2024-08-05 DIAGNOSIS — I10 PRIMARY HYPERTENSION: ICD-10-CM

## 2024-08-05 DIAGNOSIS — E78.5 HYPERLIPIDEMIA, UNSPECIFIED HYPERLIPIDEMIA TYPE: ICD-10-CM

## 2024-08-05 DIAGNOSIS — E03.9 HYPOTHYROIDISM, UNSPECIFIED TYPE: ICD-10-CM

## 2024-08-05 DIAGNOSIS — Z12.31 ENCOUNTER FOR SCREENING MAMMOGRAM FOR MALIGNANT NEOPLASM OF BREAST: ICD-10-CM

## 2024-08-05 DIAGNOSIS — H35.3210 EXUDATIVE AGE-RELATED MACULAR DEGENERATION OF RIGHT EYE, UNSPECIFIED STAGE (MULTI): ICD-10-CM

## 2024-08-05 DIAGNOSIS — Z00.00 MEDICARE ANNUAL WELLNESS VISIT, SUBSEQUENT: Primary | ICD-10-CM

## 2024-08-05 DIAGNOSIS — R20.0 NUMBNESS: ICD-10-CM

## 2024-08-05 PROCEDURE — 99397 PER PM REEVAL EST PAT 65+ YR: CPT | Performed by: SPECIALIST

## 2024-08-05 PROCEDURE — 3078F DIAST BP <80 MM HG: CPT | Performed by: SPECIALIST

## 2024-08-05 PROCEDURE — 1123F ACP DISCUSS/DSCN MKR DOCD: CPT | Performed by: SPECIALIST

## 2024-08-05 PROCEDURE — 1170F FXNL STATUS ASSESSED: CPT | Performed by: SPECIALIST

## 2024-08-05 PROCEDURE — 1157F ADVNC CARE PLAN IN RCRD: CPT | Performed by: SPECIALIST

## 2024-08-05 PROCEDURE — 1159F MED LIST DOCD IN RCRD: CPT | Performed by: SPECIALIST

## 2024-08-05 PROCEDURE — 1158F ADVNC CARE PLAN TLK DOCD: CPT | Performed by: SPECIALIST

## 2024-08-05 PROCEDURE — G0439 PPPS, SUBSEQ VISIT: HCPCS | Performed by: SPECIALIST

## 2024-08-05 PROCEDURE — 3075F SYST BP GE 130 - 139MM HG: CPT | Performed by: SPECIALIST

## 2024-08-05 RX ORDER — CHOLECALCIFEROL (VITAMIN D3) 25 MCG
1000 TABLET ORAL DAILY
COMMUNITY

## 2024-08-05 RX ORDER — ACETAMINOPHEN 500 MG
TABLET ORAL EVERY 6 HOURS PRN
COMMUNITY

## 2024-08-05 ASSESSMENT — ACTIVITIES OF DAILY LIVING (ADL)
DRESSING: INDEPENDENT
BATHING: INDEPENDENT
MANAGING_FINANCES: INDEPENDENT
DOING_HOUSEWORK: INDEPENDENT
GROCERY_SHOPPING: INDEPENDENT
TAKING_MEDICATION: INDEPENDENT

## 2024-08-05 ASSESSMENT — PATIENT HEALTH QUESTIONNAIRE - PHQ9
1. LITTLE INTEREST OR PLEASURE IN DOING THINGS: NOT AT ALL
2. FEELING DOWN, DEPRESSED OR HOPELESS: NOT AT ALL
2. FEELING DOWN, DEPRESSED OR HOPELESS: NOT AT ALL
SUM OF ALL RESPONSES TO PHQ9 QUESTIONS 1 AND 2: 0
SUM OF ALL RESPONSES TO PHQ9 QUESTIONS 1 AND 2: 0
1. LITTLE INTEREST OR PLEASURE IN DOING THINGS: NOT AT ALL

## 2024-08-05 ASSESSMENT — ENCOUNTER SYMPTOMS
DEPRESSION: 0
OCCASIONAL FEELINGS OF UNSTEADINESS: 0

## 2024-08-05 NOTE — PROGRESS NOTES
Subjective   Patient ID: Maryanne Gee is a 84 y.o. female who presents for Annual Exam.  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 Pmhx sig for Htn, Hyperlipid, Hypothyroidsm Osteoporosis and Mac Degen preesents for hospital discharge follow-up for nvd (projectile vomiting), Labs in ER with SEJAL and leukocytosis, CT abd/pelvis without acute findings (at Clinic) here for MAW    10 great grand kids  Son and grandchildren here  Daughters and grandkids all over the country  Rare ibuprofen  Has not had Covid    Quit smoking 45 yrs ago age 19    In 2009 had surgery no uterus and for lift she was told to use premarin to 0.3 once a week, does not have uterus  On D3 daily     Had numbness in finger tips     Still has episode dysphagia did speech therapy and didn't help, is not interested in surgery. Small left diverticulum per swallow study with ENT    Prior ruber band ligation for hermoids she will follow up with them    Said for an instant will suddenly feel like heart stops beating and feel lightheaded stands still and not sure if she feels like she will pass out.  Also more winded on exertion.  Rare, declines Cardiology and monitor     Discussed compression hose for probable dependent edema, not interested in vascular eval   Trying to stay active  Still with ankle edema in morning fine but as day goes on swelling     Has Living will health care POA  Daughter who is  in [place] is POA Starla Aquino [phone]  is POA    Goal of Care:  Treat treatable conditions but would not want to prolong the act of dying through extraordinary means if no hope for meaningful recovery.        Allergies   Allergen Reactions   • Tetanus And Diphtheria Toxoids Other     Arm swelling      Current Outpatient Medications   Medication Instructions   • acetaminophen (Tylenol) 500 mg tablet oral, Every 6 hours PRN   • alendronate (Fosamax) 70 mg tablet TAKE 1 TABLET BY MOUTH WEEKLY  WITH 8 OZ OF PLAIN WATER 30  MINUTES BEFORE FIRST  "FOOD, DRINK OR MEDS. STAY UPRIGHT FOR 30  MINS   • atorvastatin (LIPITOR) 20 mg, oral, Nightly   • cholecalciferol (VITAMIN D-3) 1,000 Units, oral, Daily   • estrogens (conjugated) (PREMARIN) 0.3 mg, oral, Once Weekly   • ibuprofen 200 mg, oral, Every 6 hours PRN   • levothyroxine (Synthroid, Levoxyl) 100 mcg tablet TAKE 1 TABLET BY MOUTH ONCE  DAILY IN THE MORNING BEFORE  MEALS 6 DAYS PER WEEK AND  ONE-HALF TABLET BY MOUTH 1 DAY  WEEKLY   • lisinopril 10 mg, oral, Daily   • vitamins A,C,E-zinc-copper (PreserVision AREDS) 2,148 mcg-113 mg-45 mg-17.4mg tablet oral, 2 times daily        Review of Systems  Constitutional  No fatigue, no fevers, no chills, no unintentional weight loss (fluctuates 1-2 pounds up or down)  HEENT:  No headaches, no dizziness, eye exams current, cloudiness on left, has Macular Degen, no hearing loss  Cardiovascular:  No chest pain, Rare 1 second palpitations, no shortness of breath with exertion (if walking fast gets more PATTERSON than in past, PATTERSON on two flight of stairs),   Respiratory:  Occasional nonproductive cough, no hemoptysis, no wheezing, No shortness of breath at rest  GI:  Occasional dysphagia, no odynophagia, no reflux, no abdominal pain, no nausea, no vomiting, no changes in bowel habits, no bright red blood per rectum, no melena  :  No urinary frequency, no dysuria, some urgency uses pad urine incontinence  MSK:  one fall, no joint pain, no joint swelling  Neuro:  No tremors, no extremity weakness, occasional finger tip   Breasts:  No abnormalities on self breast exam, no nipple discharge no skin changes    Physical Exam  /72   Pulse 81   Ht 1.57 m (5' 1.81\")   Wt 61.6 kg (135 lb 12.8 oz)   SpO2 94%   BMI 24.99 kg/m²   General:    Well-appearing  F in no acute distress, well nourished, well hydrated  Head:  Normocephalic, atraumatic  Skin:          Warm dry,   Eyes:  Anicteric sclera, pupils equal,   Ears:        TMs intact on left Cerumenosis on right  Oral:      Not " examined due to pandemic  Neck:   Supple, no cervical/supraclavicular adenopathy, no thyromegaly or nodules appreciated on exam  Cor:      Regular rate, normal S1, S2, no murmurs appreciated, no S3, no S4   Lungs:   Clear to auscultation b/l, no wheezes, no rhonchi, no crackles, no accessory respiratory muscle use  Abd:          Soft, nontender, no guarding, no rebound, no hepatosplenomegaly appreciated   Ext:            No lower extremity edema, no palpable cords  Pulses:      Pedal pulses intact  Neuro:   CN2-12 grossly intact (except funduscopic exam not performed)  Breasts:     Not examined declined    Assessment/Plan     MAW  -Plans annual influenza vaccine in fall  -Plans Covid vaccine in fall  -Tetanus allergy and botox  -Prior 2 Shingrix vaccines (said about 2 yrs ago at [facility])  -Prior RSV 11/25/2023  -Prior PCV 13 12/2016 and PPSV23 in 1/2019  -Mammog 9/21/23 ordered  -12/26/23 DXA Osteopenia repeat 2 yrs   -Colonoscopy 2012 was negative   -Labs ordered CMP CBC Fx Lipids B12 TSH    Osteopenia  -12/26/23 DXA Osteopenia repeat 2 yrs   -On fosamax since 11/2021    Breast Cancer screening  -Mammog 9/21/23 ordered    Htn  -BP today 130/82 on lisinopril 10 mg daily  -Home BP in the 120's or one teens  -Continue home BP monitoring and will let me know if BP >140/90  -Continue lisinopril    Hyperlipidemia  -On atorvastatin  -Labs ordered CMP Fx Lipids    Hypothyroidism  -On levothyroxine  -Labs ordered TSH    Numb fingers  -Labs ordered B12    PATTERSON and palpitations  -Recommended cardiology evaluation  -Referred to cardiology    Mac Degen  -Management per ophthalmology       Hortensia Pierre DO   Patient was identified as a fall risk. Risk prevention instructions provided.

## 2024-08-07 ENCOUNTER — LAB (OUTPATIENT)
Dept: LAB | Facility: LAB | Age: 84
End: 2024-08-07
Payer: MEDICARE

## 2024-08-07 DIAGNOSIS — R20.0 NUMBNESS: ICD-10-CM

## 2024-08-07 DIAGNOSIS — I10 PRIMARY HYPERTENSION: ICD-10-CM

## 2024-08-07 DIAGNOSIS — E03.9 HYPOTHYROIDISM, UNSPECIFIED TYPE: ICD-10-CM

## 2024-08-07 DIAGNOSIS — E78.5 HYPERLIPIDEMIA, UNSPECIFIED HYPERLIPIDEMIA TYPE: ICD-10-CM

## 2024-08-07 DIAGNOSIS — Z00.00 MEDICARE ANNUAL WELLNESS VISIT, SUBSEQUENT: ICD-10-CM

## 2024-08-07 LAB
ALBUMIN SERPL BCP-MCNC: 3.9 G/DL (ref 3.4–5)
ALP SERPL-CCNC: 58 U/L (ref 33–136)
ALT SERPL W P-5'-P-CCNC: 13 U/L (ref 7–45)
ANION GAP SERPL CALC-SCNC: 12 MMOL/L (ref 10–20)
AST SERPL W P-5'-P-CCNC: 18 U/L (ref 9–39)
BILIRUB SERPL-MCNC: 0.5 MG/DL (ref 0–1.2)
BUN SERPL-MCNC: 17 MG/DL (ref 6–23)
CALCIUM SERPL-MCNC: 9.3 MG/DL (ref 8.6–10.6)
CHLORIDE SERPL-SCNC: 107 MMOL/L (ref 98–107)
CHOLEST SERPL-MCNC: 167 MG/DL (ref 0–199)
CHOLESTEROL/HDL RATIO: 2.8
CO2 SERPL-SCNC: 26 MMOL/L (ref 21–32)
CREAT SERPL-MCNC: 0.74 MG/DL (ref 0.5–1.05)
EGFRCR SERPLBLD CKD-EPI 2021: 79 ML/MIN/1.73M*2
ERYTHROCYTE [DISTWIDTH] IN BLOOD BY AUTOMATED COUNT: 14.2 % (ref 11.5–14.5)
GLUCOSE SERPL-MCNC: 91 MG/DL (ref 74–99)
HCT VFR BLD AUTO: 42 % (ref 36–46)
HDLC SERPL-MCNC: 58.7 MG/DL
HGB BLD-MCNC: 13.1 G/DL (ref 12–16)
LDLC SERPL CALC-MCNC: 87 MG/DL
MCH RBC QN AUTO: 28.8 PG (ref 26–34)
MCHC RBC AUTO-ENTMCNC: 31.2 G/DL (ref 32–36)
MCV RBC AUTO: 92 FL (ref 80–100)
NON HDL CHOLESTEROL: 108 MG/DL (ref 0–149)
NRBC BLD-RTO: 0 /100 WBCS (ref 0–0)
PLATELET # BLD AUTO: 287 X10*3/UL (ref 150–450)
POTASSIUM SERPL-SCNC: 4.2 MMOL/L (ref 3.5–5.3)
PROT SERPL-MCNC: 6.6 G/DL (ref 6.4–8.2)
RBC # BLD AUTO: 4.55 X10*6/UL (ref 4–5.2)
SODIUM SERPL-SCNC: 141 MMOL/L (ref 136–145)
TRIGL SERPL-MCNC: 106 MG/DL (ref 0–149)
TSH SERPL-ACNC: 3.01 MIU/L (ref 0.44–3.98)
VIT B12 SERPL-MCNC: 219 PG/ML (ref 211–911)
VLDL: 21 MG/DL (ref 0–40)
WBC # BLD AUTO: 4.7 X10*3/UL (ref 4.4–11.3)

## 2024-08-07 PROCEDURE — 82607 VITAMIN B-12: CPT

## 2024-08-07 PROCEDURE — 85027 COMPLETE CBC AUTOMATED: CPT

## 2024-08-07 PROCEDURE — 84443 ASSAY THYROID STIM HORMONE: CPT

## 2024-08-07 PROCEDURE — 80053 COMPREHEN METABOLIC PANEL: CPT

## 2024-08-07 PROCEDURE — 80061 LIPID PANEL: CPT

## 2024-08-07 PROCEDURE — 36415 COLL VENOUS BLD VENIPUNCTURE: CPT

## 2024-08-08 PROBLEM — H35.3210: Status: ACTIVE | Noted: 2024-08-08

## 2024-08-22 ENCOUNTER — OFFICE VISIT (OUTPATIENT)
Dept: CARDIOLOGY | Facility: HOSPITAL | Age: 84
End: 2024-08-22
Payer: MEDICARE

## 2024-08-22 VITALS
HEIGHT: 62 IN | WEIGHT: 135 LBS | SYSTOLIC BLOOD PRESSURE: 127 MMHG | HEART RATE: 80 BPM | BODY MASS INDEX: 24.84 KG/M2 | DIASTOLIC BLOOD PRESSURE: 83 MMHG

## 2024-08-22 DIAGNOSIS — R00.2 PALPITATIONS: Primary | ICD-10-CM

## 2024-08-22 DIAGNOSIS — R00.2 PALPITATION: ICD-10-CM

## 2024-08-22 DIAGNOSIS — R55 NEAR SYNCOPE: ICD-10-CM

## 2024-08-22 DIAGNOSIS — R06.09 DOE (DYSPNEA ON EXERTION): ICD-10-CM

## 2024-08-22 DIAGNOSIS — E78.5 HYPERLIPIDEMIA, UNSPECIFIED HYPERLIPIDEMIA TYPE: ICD-10-CM

## 2024-08-22 LAB
ATRIAL RATE: 80 BPM
P AXIS: 61 DEGREES
P OFFSET: 207 MS
P ONSET: 147 MS
PR INTERVAL: 162 MS
Q ONSET: 228 MS
QRS COUNT: 13 BEATS
QRS DURATION: 74 MS
QT INTERVAL: 382 MS
QTC CALCULATION(BAZETT): 440 MS
QTC FREDERICIA: 420 MS
R AXIS: 12 DEGREES
T AXIS: 71 DEGREES
T OFFSET: 419 MS
VENTRICULAR RATE: 80 BPM

## 2024-08-22 PROCEDURE — 93005 ELECTROCARDIOGRAM TRACING: CPT | Performed by: NURSE PRACTITIONER

## 2024-08-22 PROCEDURE — 1159F MED LIST DOCD IN RCRD: CPT | Performed by: NURSE PRACTITIONER

## 2024-08-22 PROCEDURE — 3074F SYST BP LT 130 MM HG: CPT | Performed by: NURSE PRACTITIONER

## 2024-08-22 PROCEDURE — 99214 OFFICE O/P EST MOD 30 MIN: CPT | Performed by: NURSE PRACTITIONER

## 2024-08-22 PROCEDURE — 1126F AMNT PAIN NOTED NONE PRSNT: CPT | Performed by: NURSE PRACTITIONER

## 2024-08-22 PROCEDURE — 1157F ADVNC CARE PLAN IN RCRD: CPT | Performed by: NURSE PRACTITIONER

## 2024-08-22 PROCEDURE — 3078F DIAST BP <80 MM HG: CPT | Performed by: NURSE PRACTITIONER

## 2024-08-22 PROCEDURE — 99204 OFFICE O/P NEW MOD 45 MIN: CPT | Performed by: NURSE PRACTITIONER

## 2024-08-22 ASSESSMENT — PATIENT HEALTH QUESTIONNAIRE - PHQ9
2. FEELING DOWN, DEPRESSED OR HOPELESS: NOT AT ALL
1. LITTLE INTEREST OR PLEASURE IN DOING THINGS: NOT AT ALL
SUM OF ALL RESPONSES TO PHQ9 QUESTIONS 1 AND 2: 0

## 2024-08-22 ASSESSMENT — ENCOUNTER SYMPTOMS
OCCASIONAL FEELINGS OF UNSTEADINESS: 0
LOSS OF SENSATION IN FEET: 0
DEPRESSION: 0

## 2024-08-22 ASSESSMENT — PAIN SCALES - GENERAL: PAINLEVEL: 0-NO PAIN

## 2024-08-22 NOTE — PATIENT INSTRUCTIONS
Echocardiogram  Stress test  Event monitor  Take Atorvastatin 20 mg every day  Check fasting lipid panel in 3 months  Follow up in 3 months

## 2024-08-22 NOTE — PROGRESS NOTES
"Primary Care Physician: Hortensia Pierre DO  Date of Visit: 2024 10:00 AM EDT  Location of visit: [facility]     Chief Complaint:   Chief Complaint   Patient presents with    New Patient Visit        HPI / Summary:   Maryanne Gee is a 84 y.o. female presents for followup. Seen in collaboration with Dr. Varela. She was seen today to establish cardiovascular care. She has significant past medical history of Hypertension, Hyperlipidemia, Hypothyroidism. She has chronic mild dyspnea on exertion ambulating up and down 3 flights of stairs. She also has noticed dyspnea on exertion when ambulating up a hill. Her dyspnea on exertion has been worse over the past one year. She has never had covid. She is able to walk on flat ground without chest pain or dyspnea. She is able to exercise in the pool without symptoms. Has chronic lower extremity edema in her lower extremities at the end of the day for the past few years. She checks her blood pressure at home a few times a week. Reports blood pressures are typically 120s over 70. She has a very rare palpitation at which time she feels like she is going to \"faint.\" Feels like her heart stops beating. This is brief lasting less than 10 seconds. She has not had any syncope. Denies chest pain,  orthopnea, pnd, syncope or bleeding issues.     She has been taking Atorvastatin 20 mg on // and half tablet all other days. She has been taking statin for the past 20 years.     She has allergies to Tetanus shot- reports swelling of arm.     She has significant past medical history of Hypertension, Hyperlipidemia, Hypothyroidism, Macular degeneration.   Denies personal history of Coronary artery disease, Venous thromboembolism, Asthma, COPD, Diabetes, CKD, or cancers    She quit smoking 45 years ago. She smoked under 1 pack per day for 20 years. She drinks 1 glass of wine or beer a couple times a week. She denies illicit drug use. She is a retired nurse.     Her father  " age 51 from an MI. Her mother  age 95 from CVA. She denies any significant family history of sudden cardiac death and venous thromboembolism.              Past Medical History:  Past Medical History:   Diagnosis Date    Acute upper respiratory infection, unspecified 2019    Viral URI    Carpal tunnel syndrome, left upper limb 2016    Carpal tunnel syndrome of left wrist    Chronic sinusitis, unspecified 2019    Sinobronchitis    Cough, unspecified 2014    Cough    Difficulty in walking, not elsewhere classified 2018    Difficulty walking    Encounter for full-term uncomplicated delivery [facility]     Spontaneous vaginal delivery    Encounter for immunization 2019    Need for shingles vaccine    Encounter for immunization 2019    Need for Streptococcus pneumoniae and influenza vaccination    Encounter for other preprocedural examination 2016    Pre-op exam    Encounter for screening for osteoporosis 2021    Osteoporosis screening    Encounter for screening mammogram for malignant neoplasm of breast     Visit for screening mammogram    Flushing 10/28/2014    Hot flashes    Hemorrhage of anus and rectum 2020    BRBPR (bright red blood per rectum)    Osteoarthritis of knee, unspecified 2018    Osteoarthritis of knee    Other conditions influencing health status 10/22/2013    Foot pain, unspecified laterality    Other specified disorders of bladder 2013    Other bladder disorder    Pain in right foot 2017    Acute foot pain, right    Pain in right foot 2015    Right foot pain    Pain in unspecified knee 2018    Knee pain    Personal history of diseases of the blood and blood-forming organs and certain disorders involving the immune mechanism 2013    History of iron deficiency anemia    Personal history of other diseases of the circulatory system 2013    History of varicose veins    Personal history of other diseases  of the digestive system 12/18/2017    History of chronic constipation    Personal history of other diseases of the digestive system 03/04/2013    History of hemorrhoids    Personal history of other diseases of the musculoskeletal system and connective tissue 03/04/2013    History of backache    Personal history of other diseases of the nervous system and sense organs 10/08/2014    History of hearing loss    Personal history of other diseases of the nervous system and sense organs 07/12/2016    History of carpal tunnel syndrome    Personal history of other diseases of the nervous system and sense organs 10/08/2014    History of impacted cerumen    Personal history of other diseases of the respiratory system 10/02/2017    History of acute bronchitis with bronchospasm    Personal history of other diseases of the respiratory system 04/26/2022    History of acute sinusitis    Personal history of other drug therapy 07/21/2014    Personal history of other drug therapy    Personal history of other endocrine, nutritional and metabolic disease 04/17/2013    History of hypothyroidism    Personal history of other specified conditions 03/04/2013    History of edema    Personal history of urinary (tract) infections 12/07/2019    History of urinary tract infection    Postpartum depression 09/26/2022    Mild postpartum depression    Pure hypercholesterolemia, unspecified 04/17/2013    Low-density-lipoid-type (LDL) hyperlipoproteinemia    Rectocele 03/04/2013    Rectocele    Unspecified chronic otitis externa, unspecified ear 10/08/2014    Chronic otitis externa        Past Surgical History:  Past Surgical History:   Procedure Laterality Date    BREAST CYST ASPIRATION      HERNIA REPAIR  08/26/2022    Inguinal Hernia Repair    HYSTERECTOMY  03/04/2013    Hysterectomy    KNEE ARTHROPLASTY  06/26/2020    Knee Arthroplasty    KNEE ARTHROPLASTY  04/17/2013    Knee Arthroplasty    OTHER SURGICAL HISTORY  09/26/2022    Hemorrhoid rubber  band ligation    OTHER SURGICAL HISTORY Bilateral 01/06/2020    Carpal tunnel surgery    OTHER SURGICAL HISTORY  01/06/2020    Tonsillectomy    OTHER SURGICAL HISTORY  05/14/2021    Rectocele repair    TUBAL LIGATION  03/04/2013    Tubal Ligation          Social History:   reports that she quit smoking about 54 years ago. Her smoking use included cigarettes. She started smoking about 74 years ago. She has a 20 pack-year smoking history. She has never used smokeless tobacco. She reports current alcohol use of about 2.0 standard drinks of alcohol per week. She reports that she does not use drugs.     Family History:  family history includes Heart attack in her father; Heart failure in her sister; Leukemia in her sister; Multiple myeloma in her brother; Pulmonary fibrosis in her sister; cva in her mother.      Allergies:  Allergies   Allergen Reactions    Tetanus And Diphtheria Toxoids Other     Arm swelling       Outpatient Medications:  Current Outpatient Medications   Medication Instructions    acetaminophen (Tylenol) 500 mg tablet oral, Every 6 hours PRN    alendronate (Fosamax) 70 mg tablet TAKE 1 TABLET BY MOUTH WEEKLY  WITH 8 OZ OF PLAIN WATER 30  MINUTES BEFORE FIRST FOOD, DRINK OR MEDS. STAY UPRIGHT FOR 30  MINS    atorvastatin (LIPITOR) 20 mg, oral, Nightly    cholecalciferol (VITAMIN D-3) 1,000 Units, oral, Daily    estrogens (conjugated) (PREMARIN) 0.3 mg, oral, Once Weekly    ibuprofen 200 mg, oral, Every 6 hours PRN    levothyroxine (Synthroid, Levoxyl) 100 mcg tablet TAKE 1 TABLET BY MOUTH ONCE  DAILY IN THE MORNING BEFORE  MEALS 6 DAYS PER WEEK AND  ONE-HALF TABLET BY MOUTH 1 DAY  WEEKLY    lisinopril 10 mg, oral, Daily    vitamins A,C,E-zinc-copper (PreserVision AREDS) 2,148 mcg-113 mg-45 mg-17.4mg tablet oral, 2 times daily       Physical Exam:  Vitals:    08/22/24 0951 08/22/24 1006 08/22/24 1016 08/22/24 1017   BP: 144/72 117/72 133/81 127/83   BP Location: Left arm Right arm Left arm Right arm  "  Patient Position: Sitting  Sitting Sitting   Pulse: 80      Weight: 61.2 kg (135 lb)      Height: 1.575 m (5' 2\")        Wt Readings from Last 5 Encounters:   08/22/24 61.2 kg (135 lb)   08/05/24 61.6 kg (135 lb 12.8 oz)   02/05/24 60.8 kg (134 lb)   11/28/23 61.3 kg (135 lb 3.2 oz)   08/28/23 61.5 kg (135 lb 9.6 oz)     Body mass index is 24.69 kg/m².     GENERAL: alert, cooperative, pleasant, in no acute distress  SKIN: warm and dry  NECK: Normal JVD, negative HJR. No bilateral carotid bruits  CARDIAC: Regular rate and rhythm with no rubs, murmurs, or gallops  CHEST: Normal respiratory efforts, lungs clear to auscultation bilaterally.  ABDOMEN: soft, nontender, nondistended  EXTREMITIES: no edema, +2 palpable RP and PT pulses bilaterally       Last Labs:  Recent Labs     08/07/24  0927 09/13/23  1237 08/29/22  1521   WBC 4.7 10.0 5.8   HGB 13.1 13.1 12.8   HCT 42.0 42.3 40.6    309 288   MCV 92 97 95     Recent Labs     08/07/24  0927 02/05/24  1329 09/13/23  1237    139 140   K 4.2 4.5 4.8    105 103   BUN 17 15 20   CREATININE 0.74 0.85 0.93     CMP -  Lab Results   Component Value Date    CALCIUM 9.3 08/07/2024    PROT 6.6 08/07/2024    ALBUMIN 3.9 08/07/2024    AST 18 08/07/2024    ALT 13 08/07/2024    ALKPHOS 58 08/07/2024    BILITOT 0.5 08/07/2024       LIPID PANEL -   Lab Results   Component Value Date    CHOL 167 08/07/2024    HDL 58.7 08/07/2024    LDLF 91 09/13/2023    TRIG 106 08/07/2024   LDL 87 8/7/24    No results found for: \"BNP\", \"HGBA1C\"    Last Cardiology Tests:  ECG:  Obtained and reviewed EKG- normal sinus rhythm HR 80, with sinus arrhythmia    Echo:  Echo Results:  No results found for this or any previous visit from the past 365 days.         Cath:      Stress Test:  Stress Results:  No results found for this or any previous visit from the past 365 days.     CT abdomen pelvis with IV contrast 1/17/24  Vasculature:    - Abdominal aorta and iliac arteries: Atherosclerotic " "calcifications   without aneurysm.    - Celiac and SMA: Atherosclerotic calcifications at the origins with   narrowing at the celiac trunk origin    - Portal venous system (SMV, splenic vein, portal vein and branches):   Patent.    - Hepatic veins: Patent.     Assessment/Plan   Maryanne \"Kendall" was seen today for new patient visit.  Diagnoses and all orders for this visit:  Palpitations (Primary)  -     ECG 12 lead (Clinic Performed)  PATTERSON (dyspnea on exertion)  -     Referral to Cardiology  -     Transthoracic echo (TTE) complete; Future  -     perflutren lipid microspheres (Definity) injection 0.5-10 mL of dilution  -     Nuclear Stress Test; Future  Palpitation  -     Referral to Cardiology  Near syncope  -     ECG 12 lead (Clinic Performed)  -     Holter Or Event Cardiac Monitor; Future  Hyperlipidemia, unspecified hyperlipidemia type  -     Lipid Panel; Future          In summary Ms. Maryanne Gee is a pleasant 84 year old with significant past medical history of Hypertension, Hyperlipidemia, Aortic atherosclerotic calcifications on CT. She was seen today to establish cardiovascular care. She has noticed increased dyspnea on exertion walking up stairs and hills over the past year. Her blood pressure is controlled. She is euvolemic by clinical exam. I have ordered an echocardiogram for surveillance of LV function and stress test as she has not had a recent ischemic evaluation. I have ordered an event monitor for surveillance of arrhythmia and/or high grade AV block as she episodes of feeling syncopal. Her recent lipid panel is not at goal. Given her aortic atherosclerotic calcifications on CT I did recommend she take Atorvastatin 20 mg once a day. She has not had prior intolerance to statin. I have ordered repeat lipid to be done in 3 months. She will continue all other current cardiovascular medications. She will follow up in 3 months. She will call sooner with questions or concerns.     Orders:  Orders Placed This " Encounter   Procedures    Lipid Panel    Holter Or Event Cardiac Monitor    ECG 12 lead (Clinic Performed)    Transthoracic echo (TTE) complete      Followup Appts:  Future Appointments   Date Time Provider Department [place]   9/5/2024  1:00 PM LAURA Quintanilla [id number] [facility]   9/24/2024 11:15 AM [place] [id number] MAMMO 1 [id number] [facility]   2/7/2025 11:00 AM Hortensia Pierre DO [id number] [facility]   8/6/2025  1:00 PM Hortensia Pierre DO [id number] [facility]           ____________________________________________________________  LAURA Riojas  [facility]  [facility]

## 2024-08-28 ENCOUNTER — HOSPITAL ENCOUNTER (OUTPATIENT)
Dept: CARDIOLOGY | Facility: HOSPITAL | Age: 84
Discharge: HOME | End: 2024-08-28
Payer: MEDICARE

## 2024-08-28 DIAGNOSIS — R55 NEAR SYNCOPE: ICD-10-CM

## 2024-08-28 PROCEDURE — 93246 EXT ECG>7D<15D RECORDING: CPT

## 2024-08-30 DIAGNOSIS — E78.49 OTHER HYPERLIPIDEMIA: ICD-10-CM

## 2024-08-30 DIAGNOSIS — I10 PRIMARY HYPERTENSION: ICD-10-CM

## 2024-08-30 DIAGNOSIS — E03.9 HYPOTHYROIDISM, UNSPECIFIED TYPE: ICD-10-CM

## 2024-08-31 RX ORDER — LEVOTHYROXINE SODIUM 100 UG/1
TABLET ORAL
Qty: 94 TABLET | Refills: 3 | Status: SHIPPED | OUTPATIENT
Start: 2024-08-31

## 2024-08-31 RX ORDER — LISINOPRIL 10 MG/1
10 TABLET ORAL DAILY
Qty: 100 TABLET | Refills: 3 | Status: SHIPPED | OUTPATIENT
Start: 2024-08-31

## 2024-08-31 RX ORDER — ATORVASTATIN CALCIUM 20 MG/1
20 TABLET, FILM COATED ORAL NIGHTLY
Qty: 100 TABLET | Refills: 1 | Status: SHIPPED | OUTPATIENT
Start: 2024-08-31

## 2024-09-05 ENCOUNTER — APPOINTMENT (OUTPATIENT)
Dept: OTOLARYNGOLOGY | Facility: CLINIC | Age: 84
End: 2024-09-05
Payer: MEDICARE

## 2024-09-05 VITALS — HEIGHT: 64 IN | TEMPERATURE: 97.4 F | BODY MASS INDEX: 22.88 KG/M2 | WEIGHT: 134 LBS

## 2024-09-05 DIAGNOSIS — L29.9 EAR ITCHING: ICD-10-CM

## 2024-09-05 DIAGNOSIS — H93.8X1 SENSATION OF FULLNESS IN RIGHT EAR: ICD-10-CM

## 2024-09-05 DIAGNOSIS — H61.23 BILATERAL IMPACTED CERUMEN: Primary | ICD-10-CM

## 2024-09-05 PROCEDURE — 1160F RVW MEDS BY RX/DR IN RCRD: CPT | Performed by: NURSE PRACTITIONER

## 2024-09-05 PROCEDURE — 99213 OFFICE O/P EST LOW 20 MIN: CPT | Performed by: NURSE PRACTITIONER

## 2024-09-05 PROCEDURE — 1159F MED LIST DOCD IN RCRD: CPT | Performed by: NURSE PRACTITIONER

## 2024-09-05 PROCEDURE — 1036F TOBACCO NON-USER: CPT | Performed by: NURSE PRACTITIONER

## 2024-09-05 PROCEDURE — 1157F ADVNC CARE PLAN IN RCRD: CPT | Performed by: NURSE PRACTITIONER

## 2024-09-05 ASSESSMENT — PATIENT HEALTH QUESTIONNAIRE - PHQ9
SUM OF ALL RESPONSES TO PHQ9 QUESTIONS 1 AND 2: 0
2. FEELING DOWN, DEPRESSED OR HOPELESS: NOT AT ALL
1. LITTLE INTEREST OR PLEASURE IN DOING THINGS: NOT AT ALL

## 2024-09-05 NOTE — PROGRESS NOTES
"Subjective   Patient ID: Maryanne Gee \"Kendall" is a 84 y.o. female who presents for Follow-up.  HPI  This patient presents for scheduled cerumen removal.  Today, she complains of right fullness and itching.  Her PCP noted a right cerumen impaction on recent exam.  Patient was seen at an urgent care for attempted cleaning via lavage which was not successful.  She denies any otalgia, otorrhea.  She does not wear hearing amplification.  Review of Systems  A comprehensive or 10 points review of the patient's constitutional, neurological, HEENT, pulmonary, cardiovascular and genito-urinary systems showed only those mentioned in history of present illness.    Objective   Physical Exam  Constitutional: no fever, chills, weight loss or weight gain   General appearance: Appears well, well-nourished, well groomed. No acute distress.   Communication: Normal communication   Psychiatric: Oriented to person, place and time. Normal mood and affect.   Neurologic: Cranial nerves II-XII grossly intact and symmetric bilaterally.   Head and Face:   Head: Atraumatic with no masses, lesions or scarring.   Face: Normal symmetry, no paralysis, synkinesis or facial tic. No scars or deformities.     Eyes: Conjunctiva not edematous or erythematous   Ears: External inspection of ears with no deformity, scars or masses.  Bilateral canals with cerumen impaction     Neck: Normal appearing, symmetric, trachea midline.   Cardiovascular: Examination of peripheral vascular system shows no clubbing or cyanosis.   Respiratory: No respiratory distress increased work of breathing. Inspection of the chest with symmetric chest expansion and normal respiratory effort.   Skin: No rashes in the head or neck    Assessment/Plan     This patient presents for subsequent evaluation of acute acquired bilateral cerumen impaction, right aural fullness, right EAC itching.    Reassurance given that otologic exam is normal after cleaning.  She may follow-up as needed.  " "All questions were answered to patient's satisfaction.    This note was created using speech recognition transcription software. Despite proofreading, several typographical errors might be present that might affect the meaning of the content. Please call with any questions.  Patient ID: Maryanne Gee \"Kendall" is a 84 y.o. female.    Ear cerumen removal    Date/Time: 9/5/2024 1:16 PM    Performed by: LAURA Quintanilla  Authorized by: LAURA Quintanilla    Consent:     Consent obtained:  Verbal    Consent given by:  Patient    Risks discussed:  Pain    Alternatives discussed:  No treatment  Procedure details:     Location:  L ear and R ear    Procedure type comment:  Suction    Procedure outcomes: cerumen removed    Post-procedure details:     Inspection:  No bleeding, ear canal clear and TM intact    Hearing quality:  Improved    Procedure completion:  Tolerated well, no immediate complications         LAURA Quintanilla 09/05/24 1:11 PM   "

## 2024-09-06 ENCOUNTER — APPOINTMENT (OUTPATIENT)
Dept: RADIOLOGY | Facility: HOSPITAL | Age: 84
End: 2024-09-06
Payer: MEDICARE

## 2024-09-06 ENCOUNTER — APPOINTMENT (OUTPATIENT)
Dept: CARDIOLOGY | Facility: HOSPITAL | Age: 84
End: 2024-09-06
Payer: MEDICARE

## 2024-09-24 ENCOUNTER — HOSPITAL ENCOUNTER (OUTPATIENT)
Dept: RADIOLOGY | Facility: CLINIC | Age: 84
Discharge: HOME | End: 2024-09-24
Payer: MEDICARE

## 2024-09-24 VITALS — HEIGHT: 62 IN | WEIGHT: 134 LBS | BODY MASS INDEX: 24.66 KG/M2

## 2024-09-24 DIAGNOSIS — Z12.31 ENCOUNTER FOR SCREENING MAMMOGRAM FOR MALIGNANT NEOPLASM OF BREAST: ICD-10-CM

## 2024-09-24 PROCEDURE — 77063 BREAST TOMOSYNTHESIS BI: CPT | Performed by: RADIOLOGY

## 2024-09-24 PROCEDURE — 77067 SCR MAMMO BI INCL CAD: CPT

## 2024-09-24 PROCEDURE — 77067 SCR MAMMO BI INCL CAD: CPT | Performed by: RADIOLOGY

## 2024-10-07 ENCOUNTER — TELEPHONE (OUTPATIENT)
Dept: PRIMARY CARE | Facility: CLINIC | Age: 84
End: 2024-10-07

## 2024-10-30 ENCOUNTER — TELEPHONE (OUTPATIENT)
Dept: CARDIOLOGY | Facility: HOSPITAL | Age: 84
End: 2024-10-30
Payer: MEDICARE

## 2024-11-12 ENCOUNTER — HOSPITAL ENCOUNTER (OUTPATIENT)
Dept: CARDIOLOGY | Facility: HOSPITAL | Age: 84
Discharge: HOME | End: 2024-11-12
Payer: MEDICARE

## 2024-11-12 ENCOUNTER — HOSPITAL ENCOUNTER (OUTPATIENT)
Dept: RADIOLOGY | Facility: HOSPITAL | Age: 84
Discharge: HOME | End: 2024-11-12
Payer: MEDICARE

## 2024-11-12 DIAGNOSIS — R06.00 DYSPNEA, UNSPECIFIED: ICD-10-CM

## 2024-11-12 DIAGNOSIS — R06.09 DOE (DYSPNEA ON EXERTION): ICD-10-CM

## 2024-11-12 PROCEDURE — A9500 TC99M SESTAMIBI: HCPCS | Performed by: NURSE PRACTITIONER

## 2024-11-12 PROCEDURE — 3430000001 HC RX 343 DIAGNOSTIC RADIOPHARMACEUTICALS: Performed by: NURSE PRACTITIONER

## 2024-11-12 PROCEDURE — 93306 TTE W/DOPPLER COMPLETE: CPT

## 2024-11-12 PROCEDURE — 93017 CV STRESS TEST TRACING ONLY: CPT

## 2024-11-12 PROCEDURE — 93016 CV STRESS TEST SUPVJ ONLY: CPT | Performed by: INTERNAL MEDICINE

## 2024-11-12 PROCEDURE — 93018 CV STRESS TEST I&R ONLY: CPT | Performed by: INTERNAL MEDICINE

## 2024-11-12 PROCEDURE — 78452 HT MUSCLE IMAGE SPECT MULT: CPT | Performed by: NUCLEAR MEDICINE

## 2024-11-12 PROCEDURE — 78452 HT MUSCLE IMAGE SPECT MULT: CPT

## 2024-11-12 PROCEDURE — 93306 TTE W/DOPPLER COMPLETE: CPT | Performed by: INTERNAL MEDICINE

## 2024-11-12 RX ORDER — TETRAKIS(2-METHOXYISOBUTYLISOCYANIDE)COPPER(I) TETRAFLUOROBORATE 1 MG/ML
10.2 INJECTION, POWDER, LYOPHILIZED, FOR SOLUTION INTRAVENOUS
Status: COMPLETED | OUTPATIENT
Start: 2024-11-12 | End: 2024-11-12

## 2024-11-12 RX ORDER — TETRAKIS(2-METHOXYISOBUTYLISOCYANIDE)COPPER(I) TETRAFLUOROBORATE 1 MG/ML
35 INJECTION, POWDER, LYOPHILIZED, FOR SOLUTION INTRAVENOUS
Status: COMPLETED | OUTPATIENT
Start: 2024-11-12 | End: 2024-11-12

## 2024-11-14 LAB
AORTIC VALVE MEAN GRADIENT: 3 MMHG
AORTIC VALVE PEAK VELOCITY: 1.13 M/S
AV PEAK GRADIENT: 5 MMHG
AVA (PEAK VEL): 2.35 CM2
AVA (VTI): 2.26 CM2
EJECTION FRACTION APICAL 4 CHAMBER: 71.6
EJECTION FRACTION: 73 %
LEFT ATRIUM VOLUME AREA LENGTH INDEX BSA: 25.4 ML/M2
LEFT VENTRICLE INTERNAL DIMENSION DIASTOLE: 2.3 CM (ref 3.5–6)
LEFT VENTRICULAR OUTFLOW TRACT DIAMETER: 1.7 CM
MITRAL VALVE E/A RATIO: 0.64
TRICUSPID ANNULAR PLANE SYSTOLIC EXCURSION: 2.1 CM

## 2024-11-18 ENCOUNTER — LAB (OUTPATIENT)
Dept: LAB | Facility: LAB | Age: 84
End: 2024-11-18
Payer: MEDICARE

## 2024-11-18 DIAGNOSIS — E78.5 HYPERLIPIDEMIA, UNSPECIFIED HYPERLIPIDEMIA TYPE: ICD-10-CM

## 2024-11-18 LAB
CHOLEST SERPL-MCNC: 169 MG/DL (ref 0–199)
CHOLESTEROL/HDL RATIO: 2.9
HDLC SERPL-MCNC: 57.6 MG/DL
LDLC SERPL CALC-MCNC: 84 MG/DL
NON HDL CHOLESTEROL: 111 MG/DL (ref 0–149)
TRIGL SERPL-MCNC: 138 MG/DL (ref 0–149)
VLDL: 28 MG/DL (ref 0–40)

## 2024-11-18 PROCEDURE — 36415 COLL VENOUS BLD VENIPUNCTURE: CPT

## 2024-11-18 PROCEDURE — 80061 LIPID PANEL: CPT

## 2024-11-22 DIAGNOSIS — M85.851 OSTEOPENIA OF RIGHT HIP: ICD-10-CM

## 2024-11-22 DIAGNOSIS — N95.1 VASOMOTOR SYMPTOMS DUE TO MENOPAUSE: ICD-10-CM

## 2024-11-25 RX ORDER — ALENDRONATE SODIUM 70 MG/1
TABLET ORAL
Qty: 12 TABLET | Refills: 0 | Status: SHIPPED | OUTPATIENT
Start: 2024-11-25

## 2025-01-25 DIAGNOSIS — M85.851 OSTEOPENIA OF RIGHT HIP: ICD-10-CM

## 2025-01-27 DIAGNOSIS — E78.49 OTHER HYPERLIPIDEMIA: ICD-10-CM

## 2025-01-27 RX ORDER — ALENDRONATE SODIUM 70 MG/1
TABLET ORAL
Qty: 12 TABLET | Refills: 1 | Status: SHIPPED | OUTPATIENT
Start: 2025-01-27

## 2025-01-28 RX ORDER — ATORVASTATIN CALCIUM 20 MG/1
20 TABLET, FILM COATED ORAL NIGHTLY
Qty: 100 TABLET | Refills: 1 | Status: SHIPPED | OUTPATIENT
Start: 2025-01-28

## 2025-02-05 ENCOUNTER — OFFICE VISIT (OUTPATIENT)
Dept: CARDIOLOGY | Facility: HOSPITAL | Age: 85
End: 2025-02-05
Payer: MEDICARE

## 2025-02-05 VITALS
BODY MASS INDEX: 24.66 KG/M2 | WEIGHT: 134 LBS | HEART RATE: 90 BPM | SYSTOLIC BLOOD PRESSURE: 157 MMHG | DIASTOLIC BLOOD PRESSURE: 86 MMHG | OXYGEN SATURATION: 100 % | HEIGHT: 62 IN

## 2025-02-05 DIAGNOSIS — E78.5 HYPERLIPIDEMIA, UNSPECIFIED HYPERLIPIDEMIA TYPE: ICD-10-CM

## 2025-02-05 DIAGNOSIS — I10 PRIMARY HYPERTENSION: Primary | ICD-10-CM

## 2025-02-05 LAB
ATRIAL RATE: 90 BPM
P AXIS: 55 DEGREES
P OFFSET: 202 MS
P ONSET: 151 MS
PR INTERVAL: 150 MS
Q ONSET: 226 MS
QRS COUNT: 15 BEATS
QRS DURATION: 74 MS
QT INTERVAL: 364 MS
QTC CALCULATION(BAZETT): 445 MS
QTC FREDERICIA: 416 MS
R AXIS: 9 DEGREES
T AXIS: 81 DEGREES
T OFFSET: 408 MS
VENTRICULAR RATE: 90 BPM

## 2025-02-05 PROCEDURE — 1159F MED LIST DOCD IN RCRD: CPT | Performed by: INTERNAL MEDICINE

## 2025-02-05 PROCEDURE — 1157F ADVNC CARE PLAN IN RCRD: CPT | Performed by: INTERNAL MEDICINE

## 2025-02-05 PROCEDURE — 1160F RVW MEDS BY RX/DR IN RCRD: CPT | Performed by: INTERNAL MEDICINE

## 2025-02-05 PROCEDURE — 3078F DIAST BP <80 MM HG: CPT | Performed by: INTERNAL MEDICINE

## 2025-02-05 PROCEDURE — 99214 OFFICE O/P EST MOD 30 MIN: CPT | Performed by: INTERNAL MEDICINE

## 2025-02-05 PROCEDURE — 3077F SYST BP >= 140 MM HG: CPT | Performed by: INTERNAL MEDICINE

## 2025-02-05 PROCEDURE — 1036F TOBACCO NON-USER: CPT | Performed by: INTERNAL MEDICINE

## 2025-02-05 PROCEDURE — G2211 COMPLEX E/M VISIT ADD ON: HCPCS | Performed by: INTERNAL MEDICINE

## 2025-02-05 PROCEDURE — 93005 ELECTROCARDIOGRAM TRACING: CPT | Performed by: INTERNAL MEDICINE

## 2025-02-05 NOTE — PROGRESS NOTES
Primary Care Physician: Hortensia Pierre DO  Date of Visit: 02/05/2025  1:20 PM EST  Location of visit: [facility]     Chief Complaint:   Chief Complaint   Patient presents with    New Patient Visit        HPI / Summary:   Maryanne Gee is a 84 y.o. female who presents for followup.  She has no particular complaints.  She is generally active and exercises twice a week walking on a treadmill for 15 minutes followed by lifting weights without chest pain or shortness of breath.  She has mild dyspnea on exertion when walking up 7 flights of stairs at her apartment.  This has not changed.  The patient denies chest pain, palpitations, lightheadedness, syncope, orthopnea, paroxysmal nocturnal dyspnea, lower extremity edema, or bleeding problems.          Past Medical History:   Diagnosis Date    Acute upper respiratory infection, unspecified 03/20/2019    Viral URI    Carpal tunnel syndrome, left upper limb 08/02/2016    Carpal tunnel syndrome of left wrist    Chronic sinusitis, unspecified 03/25/2019    Sinobronchitis    Cough, unspecified 11/06/2014    Cough    Difficulty in walking, not elsewhere classified 03/23/2018    Difficulty walking    Encounter for full-term uncomplicated delivery     Spontaneous vaginal delivery    Encounter for immunization 01/07/2019    Need for shingles vaccine    Encounter for immunization 01/07/2019    Need for Streptococcus pneumoniae and influenza vaccination    Encounter for other preprocedural examination 07/12/2016    Pre-op exam    Encounter for screening for osteoporosis 04/14/2021    Osteoporosis screening    Encounter for screening mammogram for malignant neoplasm of breast     Visit for screening mammogram    Flushing 10/28/2014    Hot flashes    Hemorrhage of anus and rectum 12/22/2020    BRBPR (bright red blood per rectum)    Osteoarthritis of knee, unspecified 06/21/2018    Osteoarthritis of knee    Other conditions influencing health status 10/22/2013    Foot pain,  unspecified laterality    Other specified disorders of bladder 03/04/2013    Other bladder disorder    Pain in right foot 08/13/2017    Acute foot pain, right    Pain in right foot 11/03/2015    Right foot pain    Pain in unspecified knee 03/23/2018    Knee pain    Personal history of diseases of the blood and blood-forming organs and certain disorders involving the immune mechanism 04/17/2013    History of iron deficiency anemia    Personal history of other diseases of the circulatory system 03/04/2013    History of varicose veins    Personal history of other diseases of the digestive system 12/18/2017    History of chronic constipation    Personal history of other diseases of the digestive system 03/04/2013    History of hemorrhoids    Personal history of other diseases of the musculoskeletal system and connective tissue 03/04/2013    History of backache    Personal history of other diseases of the nervous system and sense organs 10/08/2014    History of hearing loss    Personal history of other diseases of the nervous system and sense organs 07/12/2016    History of carpal tunnel syndrome    Personal history of other diseases of the nervous system and sense organs 10/08/2014    History of impacted cerumen    Personal history of other diseases of the respiratory system 10/02/2017    History of acute bronchitis with bronchospasm    Personal history of other diseases of the respiratory system 04/26/2022    History of acute sinusitis    Personal history of other drug therapy 07/21/2014    Personal history of other drug therapy    Personal history of other endocrine, nutritional and metabolic disease 04/17/2013    History of hypothyroidism    Personal history of other specified conditions 03/04/2013    History of edema    Personal history of urinary (tract) infections 12/07/2019    History of urinary tract infection    Postpartum depression 09/26/2022    Mild postpartum depression    Pure hypercholesterolemia,  unspecified 04/17/2013    Low-density-lipoid-type (LDL) hyperlipoproteinemia    Rectocele 03/04/2013    Rectocele    Unspecified chronic otitis externa, unspecified ear 10/08/2014    Chronic otitis externa        Past Surgical History:   Procedure Laterality Date    BREAST CYST ASPIRATION      HERNIA REPAIR  08/26/2022    Inguinal Hernia Repair    HYSTERECTOMY  03/04/2013    Hysterectomy    KNEE ARTHROPLASTY  06/26/2020    Knee Arthroplasty    KNEE ARTHROPLASTY  04/17/2013    Knee Arthroplasty    OTHER SURGICAL HISTORY  09/26/2022    Hemorrhoid rubber band ligation    OTHER SURGICAL HISTORY Bilateral 01/06/2020    Carpal tunnel surgery    OTHER SURGICAL HISTORY  01/06/2020    Tonsillectomy    OTHER SURGICAL HISTORY  05/14/2021    Rectocele repair    TUBAL LIGATION  03/04/2013    Tubal Ligation          Social History:   reports that she quit smoking about 55 years ago. Her smoking use included cigarettes. She started smoking about 75 years ago. She has a 20 pack-year smoking history. She has never used smokeless tobacco. She reports current alcohol use of about 2.0 standard drinks of alcohol per week. She reports that she does not use drugs.     Family History:  family history includes Heart attack in her father; Heart failure in her sister; Leukemia in her sister; Multiple myeloma in her brother; Pulmonary fibrosis in her sister; cva in her mother.      Allergies:  Allergies   Allergen Reactions    Tetanus Vaccines And Toxoid Swelling       Outpatient Medications:  Current Outpatient Medications   Medication Instructions    acetaminophen (Tylenol) 500 mg tablet Every 6 hours PRN    alendronate (Fosamax) 70 mg tablet TAKE 1 TABLET BY MOUTH WEEKLY  WITH 8 OZ OF PLAIN WATER 30  MINUTES BEFORE FIRST FOOD, DRINK OR MEDS. STAY UPRIGHT FOR 30  MINS    atorvastatin (LIPITOR) 20 mg, oral, Nightly    cholecalciferol (VITAMIN D-3) 1,000 Units, Daily    estrogens (conjugated) (PREMARIN) 0.3 mg, oral, Once Weekly    ibuprofen  "200 mg, Every 6 hours PRN    levothyroxine (Synthroid, Levoxyl) 100 mcg tablet TAKE 1 TABLET BY MOUTH ONCE  DAILY IN THE MORNING BEFORE  MEALS 6 DAYS PER WEEK AND  ONE-HALF TABLET BY MOUTH 1 DAY  WEEKLY    lisinopril 10 mg, oral, Daily    vitamins A,C,E-zinc-copper (PreserVision AREDS) 2,148 mcg-113 mg-45 mg-17.4mg tablet 2 times daily       Physical Exam:  Vitals:    02/05/25 1325 02/05/25 1326   BP: 157/78 157/86   BP Location: Left arm Right arm   Patient Position: Sitting Sitting   BP Cuff Size: Adult Adult   Pulse: 90    SpO2: 100%    Weight: 60.8 kg (134 lb)    Height: 1.575 m (5' 2\")      Wt Readings from Last 5 Encounters:   02/05/25 60.8 kg (134 lb)   09/24/24 60.8 kg (134 lb)   09/05/24 60.8 kg (134 lb)   08/22/24 61.2 kg (135 lb)   08/05/24 61.6 kg (135 lb 12.8 oz)     Body mass index is 24.51 kg/m².   Repeat blood pressure was 126/66  General: Well-developed well-nourished in no acute distress  HEENT: Normocephalic atraumatic  Neck: Supple, JVP is normal negative hepatojugular reflux 2+ carotid pulses without bruit  Pulmonary: Normal respiratory effort, clear to auscultation  Cardiovascular: No right ventricular heave, normal S1 and S2, no murmurs rubs or gallops  Abdomen: Soft nontender nondistended  Extremities: Warm without edema 2+ radial pulses bilaterally   Neurologic: Alert and oriented x3  Psychiatric: Normal mood and affect     Last Labs:  CMP:  Recent Labs     08/07/24 0927 02/05/24  1329 09/13/23  1237    139 140   K 4.2 4.5 4.8    105 103   CO2 26 25 26   ANIONGAP 12 14 16   BUN 17 15 20   CREATININE 0.74 0.85 0.93   EGFR 79 68  --    GLUCOSE 91 88 81     Recent Labs     08/07/24  0927 02/05/24  1329 09/13/23  1237   ALBUMIN 3.9 4.1 4.2   ALKPHOS 58 64 62   ALT 13 17 14   AST 18 19 19   BILITOT 0.5 0.5 0.4     CBC:  Recent Labs     08/07/24  0927 09/13/23  1237 08/29/22  1521   WBC 4.7 10.0 5.8   HGB 13.1 13.1 12.8   HCT 42.0 42.3 40.6    309 288   MCV 92 97 95     COAG: " "  Recent Labs     02/05/18  1404   INR 1.0     HEME/ENDO:  Recent Labs     08/07/24  0927 05/21/24  1309 02/05/24  1329   TSH 3.01 1.38 5.34*      CARDIAC: No results for input(s): \"LDH\", \"CKMB\", \"TROPHS\", \"BNP\" in the last 58729 hours.    No lab exists for component: \"CK\", \"CKMBP\"  Recent Labs     11/18/24  1221 08/07/24  0927 09/13/23  1237 08/29/22  1521 05/21/21  1106   CHOL 169 167 183 172 166   LDLF  --   --  91 96 89   LDLCALC 84 87  --   --   --    HDL 57.6 58.7 65.3 60.5 57.0   TRIG 138 106 136 78 101       Last Cardiology Tests:  ECG:  An electrocardiogram performed today that I reviewed shows normal sinus rhythm and is normal.    Echo:  Echo Results:  Transthoracic Echo (TTE) Complete 11/12/2024    [facility], [address] [place] [address]  Tel [phone] and Fax [phone]    TRANSTHORACIC ECHOCARDIOGRAM REPORT      Patient Name:       ROGER Gomez Physician:    [id number] Pedro Varela MD  Study Date:         11/12/2024          Ordering Provider:    [id number] KELSEY PALMER  MRN/PID:            [id number]            Fellow:  Accession#:         [id number]        Nurse:  Date of Birth/Age:  1940 / 84      Sonographer:          Mac sanford                                     [facility] [place]  Gender assigned at  F                   Additional Staff:  Birth:  Height:             157.48 cm           Admit Date:  Weight:             60.78 kg            Admission Status:     Outpatient  BSA / BMI:          1.61 m2 / 24.51     Encounter#:           [id number]  kg/m2  Blood Pressure:     /                   Department Location:  [facility] Non  Invasive    Study Type:    TRANSTHORACIC ECHO (TTE) COMPLETE  Diagnosis/ICD: Dyspnea, unspecified-R06.00  Indication:    PATTERSON  CPT Code:      Echo Complete w Full Doppler-[id number]    Patient History:  Pertinent History: HTN and Hyperlipidemia.    Study Detail: The following Echo studies were performed: 2D, M-Mode, Doppler " and  color flow.      PHYSICIAN INTERPRETATION:  Left Ventricle: Left ventricular ejection fraction is normal, by visual estimate at 70-75%. There are no regional left ventricular wall motion abnormalities. The left ventricular cavity size is decreased. There is mildly increased septal and moderately increased posterior left ventricular wall thickness. There is left ventricular concentric remodeling. Spectral Doppler shows a Grade I (impaired relaxation pattern) of left ventricular diastolic filling with normal left atrial filling pressure.  Left Atrium: The left atrium is normal in size.  Right Ventricle: The right ventricle is normal in size. There is normal right ventricular global systolic function.  Right Atrium: The right atrium is normal in size.  Aortic Valve: The aortic valve is trileaflet. There is minimal aortic valve cusp calcification. The aortic valve dimensionless index is 1.00. There is trace aortic valve regurgitation. The peak instantaneous gradient of the aortic valve is 5 mmHg. The mean gradient of the aortic valve is 3 mmHg.  Mitral Valve: The mitral valve is normal in structure. There is trace mitral valve regurgitation.  Tricuspid Valve: The tricuspid valve is structurally normal. There is trace tricuspid regurgitation.  Pulmonic Valve: The pulmonic valve is structurally normal. There is trace pulmonic valve regurgitation.  Pericardium: There is no pericardial effusion noted.  Aorta: The aortic root is normal.  In comparison to the previous echocardiogram(s): There are no prior studies on this patient for comparison purposes.      CONCLUSIONS:  1. Left ventricular ejection fraction is normal, by visual estimate at 70-75%.  2. Left ventricular cavity size is decreased.  3. There is moderately increased posterior left ventricular wall thickness.  4. There is normal right ventricular global systolic function.    QUANTITATIVE DATA SUMMARY:    2D MEASUREMENTS:          Normal Ranges:  IVSd:             1.20 cm  (0.6-1.1cm)  LVPWd:           1.27 cm  (0.6-1.1cm)  LVIDd:           2.30 cm  (3.9-5.9cm)  LVIDs:           1.47 cm  LV Mass Index:   50 g/m2  LVEDV Index:     29 ml/m2  LV % FS          36.1 %      LA VOLUME:                    Normal Ranges:  LA Vol A4C:        33.0 ml    (22+/-6mL/m2)  LA Vol A2C:        46.6 ml  LA Vol BP:         40.9 ml  LA Vol Index A4C:  20.5ml/m2  LA Vol Index A2C:  28.9 ml/m2  LA Vol Index BP:   25.4 ml/m2  LA Area A4C:       13.8 cm2  LA Area A2C:       15.7 cm2  LA Major Axis A4C: 4.9 cm  LA Major Axis A2C: 4.5 cm  LA Volume Index:   25.4 ml/m2  LA Vol A4C:        32.1 ml  LA Vol A2C:        44.2 ml  LA Vol Index BSA:  23.7 ml/m2      RA VOLUME BY A/L METHOD:            Normal Ranges:  RA Vol A4C:              31.9 ml    (8.3-19.5ml)  RA Vol Index A4C:        19.8 ml/m2  RA Area A4C:             13.0 cm2  RA Major Axis A4C:       4.5 cm      AORTA MEASUREMENTS:         Normal Ranges:  Ao Sinus, d:        2.70 cm (2.1-3.5cm)  Asc Ao, d:          3.10 cm (2.1-3.4cm)      LV SYSTOLIC FUNCTION BY 2D PLANIMETRY (MOD):  Normal Ranges:  EF-A4C View:    72 % (>=55%)  EF-A2C View:    67 %  EF-Biplane:     69 %  EF-Visual:      73 %  LV EF Reported: 73 %      LV DIASTOLIC FUNCTION:             Normal Ranges:  MV Peak E:             0.61 m/s    (0.7-1.2 m/s)  MV Peak A:             0.95 m/s    (0.42-0.7 m/s)  E/A Ratio:             0.64        (1.0-2.2)  MV e'                  0.065 m/s   (>8.0)  MV lateral e'          0.08 m/s  MV medial e'           0.05 m/s  E/e' Ratio:            9.31        (<8.0)  PulmV Sys Ilia:         65.73 cm/s  PulmV Naylor Ilia:        23.54 cm/s  PulmV S/D Ilia:         2.79  PulmV A Revs Ilia:      27.53 cm/s  PulmV A Revs Dur:      148.43 msec      MITRAL VALVE:          Normal Ranges:  MV DT:        318 msec (150-240msec)      AORTIC VALVE:                     Normal Ranges:  AoV Vmax:                1.13 m/s (<=1.7m/s)  AoV Peak P.1 mmHg  (<20mmHg)  AoV Mean PG:             3.1 mmHg (1.7-11.5mmHg)  LVOT Max Ilia:            1.17 m/s (<=1.1m/s)  AoV VTI:                 23.62 cm (18-25cm)  LVOT VTI:                23.54 cm  LVOT Diameter:           1.70 cm  (1.8-2.4cm)  AoV Area, VTI:           2.26 cm2 (2.5-5.5cm2)  AoV Area,Vmax:           2.35 cm2 (2.5-4.5cm2)  AoV Dimensionless Index: 1.00      RIGHT VENTRICLE:  RV Basal 2.80 cm  TAPSE:   21.0 mm      TRICUSPID VALVE/RVSP:          Normal Ranges:  Peak TR Velocity:     2.11 m/s  IVC Diam:             1.10 cm      PULMONIC VALVE:          Normal Ranges:  PV Accel Time:  143 msec (>120ms)  PV Max Ilia:     0.8 m/s  (0.6-0.9m/s)  PV Max P.9 mmHg      Pulmonary Veins:  PulmV A Revs Dur: 148.43 msec  PulmV A Revs Ilia: 27.53 cm/s  PulmV Naylor Ilia:   23.54 cm/s  PulmV S/D Ilia:    2.79  PulmV Sys Ilia:    65.73 cm/s      [id number] Pedro Varela MD  Electronically signed on 2024 at 4:43:23 PM        ** Final **       Cath:      Stress Test:  Stress Results:  Exercise Stress Test With Myocardial Perfusion Spect (Multi Study) 2024    Narrative  Interpreted By:  Arian Schumacher and Liller Gregory  STUDY:  NUCLEAR STRESS TEST; 2024 3:05 pm    INDICATION:  Signs/Symptoms:PATTERSON.    COMPARISON:  Nuclear medicine stress test 2010    ACCESSION NUMBER(S):  [id number]    ORDERING CLINICIAN:  KELSEY PALMER    TECHNIQUE:  DIVISION OF NUCLEAR MEDICINE  PHARMACOLOGIC STRESS MYOCARDIAL PERFUSION SCAN, ONE DAY PROTOCOL    The patient received an intravenous dose of  10.2 mCi of Tc-99m  Myoview and resting emission tomographic (SPECT) images of the  myocardium were acquired. The patient then received an intravenous  infusion of 0.4mg regadenoson (Lexiscan)  followed by an additional  dose of  35 mCi of Tc-99m  Myoview. Stress phase SPECT images of the  myocardium were then acquired. These included ECG-gated images to  assess and quantify ventricular function.    FINDINGS:  Both stress and rest  studies demonstrate grossly normal perfusion  throughout the left ventricle.    The left ventricle is normal in size.    Gated images demonstrate normal LV wall motion with an LV EF  estimated at greater than 65%.    Impression  1. No evidence of stress-induced ischemia or prior infarction.  2. The left ventricle is normal in size.  3. Normal LV wall motion with an LV EF estimated at greater than 65%.    I personally reviewed the images/study and I agree with the findings  as stated above by resident physician, Dr. Emerson Hoover.    MACRO:  None    Signed by: Arian Schumacher 11/12/2024 3:14 PM  Dictation workstation:   [id number]         Cardiac Imaging:  CT abdomen pelvis with IV contrast 1/17/24  Vasculature:    - Abdominal aorta and iliac arteries: Atherosclerotic calcifications   without aneurysm.    - Celiac and SMA: Atherosclerotic calcifications at the origins with   narrowing at the celiac trunk origin    - Portal venous system (SMV, splenic vein, portal vein and branches):   Patent.    - Hepatic veins: Patent.       Assessment/Plan   Diagnoses and all orders for this visit:  Primary hypertension  Hyperlipidemia, unspecified hyperlipidemia type  -     ECG 12 lead (Clinic Performed)    In summary Ms. Gee is a pleasant 84-year-old white female with a past medical history significant for hypertension, hyper anemia, and aortic atherosclerosis on CT.  She has had no recent near syncopal events.  Her echo, stress test, and Holter monitor were reassuring.  She is euvolemic on exam.  Her initial blood pressure was mildly elevated but her repeat is normal.  Her home blood pressure readings are normal.  Her lipids are acceptable.  She should continue her current cardiovascular medications.  I encouraged her to increase her physical activity.  We will see her back in follow-up in 1 year.      Orders:  No orders of the defined types were placed in this encounter.     Followup Appts:  Future Appointments   Date  Time Provider Department [place]   2/7/2025 11:00 AM Hortensia Pierre DO [id number] [facility]   8/6/2025  1:00 PM DO ALVA [id number] [facility]           ____________________________________________________________  MD [facility] [place]  [facility]

## 2025-02-07 ENCOUNTER — APPOINTMENT (OUTPATIENT)
Dept: PRIMARY CARE | Facility: CLINIC | Age: 85
End: 2025-02-07
Payer: MEDICARE

## 2025-02-07 VITALS
HEIGHT: 62 IN | WEIGHT: 135.6 LBS | DIASTOLIC BLOOD PRESSURE: 80 MMHG | OXYGEN SATURATION: 97 % | SYSTOLIC BLOOD PRESSURE: 130 MMHG | RESPIRATION RATE: 20 BRPM | BODY MASS INDEX: 24.95 KG/M2 | HEART RATE: 98 BPM

## 2025-02-07 DIAGNOSIS — H35.3210 EXUDATIVE AGE-RELATED MACULAR DEGENERATION OF RIGHT EYE, UNSPECIFIED STAGE: ICD-10-CM

## 2025-02-07 DIAGNOSIS — M85.851 OSTEOPENIA OF RIGHT HIP: ICD-10-CM

## 2025-02-07 DIAGNOSIS — Z00.00 HEALTH CARE MAINTENANCE: ICD-10-CM

## 2025-02-07 DIAGNOSIS — I10 PRIMARY HYPERTENSION: ICD-10-CM

## 2025-02-07 DIAGNOSIS — E03.9 HYPOTHYROIDISM, UNSPECIFIED TYPE: Primary | ICD-10-CM

## 2025-02-07 DIAGNOSIS — E78.5 HYPERLIPIDEMIA, UNSPECIFIED HYPERLIPIDEMIA TYPE: ICD-10-CM

## 2025-02-07 PROCEDURE — 3074F SYST BP LT 130 MM HG: CPT | Performed by: SPECIALIST

## 2025-02-07 PROCEDURE — 1157F ADVNC CARE PLAN IN RCRD: CPT | Performed by: SPECIALIST

## 2025-02-07 PROCEDURE — 1159F MED LIST DOCD IN RCRD: CPT | Performed by: SPECIALIST

## 2025-02-07 PROCEDURE — 1160F RVW MEDS BY RX/DR IN RCRD: CPT | Performed by: SPECIALIST

## 2025-02-07 PROCEDURE — 3078F DIAST BP <80 MM HG: CPT | Performed by: SPECIALIST

## 2025-02-07 PROCEDURE — 1126F AMNT PAIN NOTED NONE PRSNT: CPT | Performed by: SPECIALIST

## 2025-02-07 PROCEDURE — 99214 OFFICE O/P EST MOD 30 MIN: CPT | Performed by: SPECIALIST

## 2025-02-07 PROCEDURE — G2211 COMPLEX E/M VISIT ADD ON: HCPCS | Performed by: SPECIALIST

## 2025-02-07 ASSESSMENT — COLUMBIA-SUICIDE SEVERITY RATING SCALE - C-SSRS: 1. IN THE PAST MONTH, HAVE YOU WISHED YOU WERE DEAD OR WISHED YOU COULD GO TO SLEEP AND NOT WAKE UP?: NO

## 2025-02-07 ASSESSMENT — PAIN SCALES - GENERAL: PAINLEVEL_OUTOF10: 0-NO PAIN

## 2025-02-07 NOTE — ASSESSMENT & PLAN NOTE
Takes biotin in preservision so will hold for 3 days before TSH blood test  TSH 3.01 8/7/2024  Continue levothyroxine 100 mcgs daily pending blood work results  Labs ordered  Orders:    Thyroid Stimulating Hormone; Future

## 2025-02-07 NOTE — ASSESSMENT & PLAN NOTE
Home bp 120/70-80  /180 today  Continue current dose lisinopril 10 mg daily  Orders:    Comprehensive Metabolic Panel; Future

## 2025-02-07 NOTE — ASSESSMENT & PLAN NOTE
Now taking 20 mg atorvastatin most nights  Atherosclerotic Calcifications of Abdominal Aorta noted on CT Abd/Pelvis 1/17/2024 at Clinic  Will recheck fx lipids  Declined CT cardiac score  Orders:    Lipid Panel; Future    Comprehensive Metabolic Panel; Future

## 2025-02-07 NOTE — PROGRESS NOTES
"Subjective   Patient ID: Maryanne Gee \"Kendall" is a 84 y.o. female who presents for Follow-up.  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    Occasionally rarely takes an advil    Said told should be taking more statin  LDL 87 in 8/2024  Had echo and stress test with cardio  Lipids ok  Now taking 20 mg atorvastatin most night no adverse reactions    Smoked age 19 to 39    Prior colpopexy at Clinic  May consider vaginal estrogen    Exercising regularly     Dating same fellow x 3 yrs    Allergies   Allergen Reactions    Tetanus Vaccines And Toxoid Swelling      Current Outpatient Medications   Medication Instructions    acetaminophen (Tylenol) 500 mg tablet Every 6 hours PRN    alendronate (Fosamax) 70 mg tablet TAKE 1 TABLET BY MOUTH WEEKLY  WITH 8 OZ OF PLAIN WATER 30  MINUTES BEFORE FIRST FOOD, DRINK OR MEDS. STAY UPRIGHT FOR 30  MINS    atorvastatin (LIPITOR) 20 mg, oral, Nightly    cholecalciferol (VITAMIN D-3) 1,000 Units, Daily    estrogens (conjugated) (PREMARIN) 0.3 mg, oral, Once Weekly    ibuprofen 200 mg, Every 6 hours PRN    levothyroxine (Synthroid, Levoxyl) 100 mcg tablet TAKE 1 TABLET BY MOUTH ONCE  DAILY IN THE MORNING BEFORE  MEALS 6 DAYS PER WEEK AND  ONE-HALF TABLET BY MOUTH 1 DAY  WEEKLY    lisinopril 10 mg, oral, Daily    vitamins A,C,E-zinc-copper (PreserVision AREDS) 2,148 mcg-113 mg-45 mg-17.4mg tablet 2 times daily        Review of Systems  Constitutional  No fatigue, no fevers, no chills, no unintentional weight loss,   HEENT:  No headaches, one spell of dizziness, no double vision, no blurred vision, no hearing loss  Cardiovascular:  No chest pain, no " "palpitations, no shortness of breath with exertion (one flight of stairs)   Respiratory:   No cough, no wheezing, No shortness of breath at rest  GI:  Rare dysphagia (saw specialist), no odynophagia, no reflux, no abdominal pain, no nausea, no vomiting, no changes in bowel habits, no bright red blood per rectum, no melena  :  No urinary frequency, no dysuria, some urgency but no urine incontinence  MSK:  No falls, no joint pain, ankle swelling goes away by end of day   Neuro:  No tremors, no extremity weakness, no (numbness resovled with vitamin B) changes in sensation    Physical Exam  /75 (BP Location: Right arm, Patient Position: Sitting, BP Cuff Size: Adult)   Pulse 98   Resp 20   Ht 1.575 m (5' 2\")   Wt 61.5 kg (135 lb 9.6 oz)   SpO2 97%   BMI 24.80 kg/m²   130/80 left arm seated  General:    Well-appearing  F in no acute distress, well nourished, well hydrated  Head:  Normocephalic, atraumatic  Skin:          Warm dry,   Eyes:  Anicteric sclera, pupils equal,   Oral:      Not examined due to pandemic  Neck:   Supple,   Cor:      Regular rate, normal S1, S2, no murmurs appreciated, no S3, no S4   Lungs:   Clear to auscultation b/l, no wheezes, no rhonchi, no crackles, no accessory respiratory muscle use      Assessment/Plan   Assessment & Plan  Hypothyroidism, unspecified type  Takes biotin in preservision so will hold for 3 days before TSH blood test  TSH 3.01 8/7/2024  Continue levothyroxine 100 mcgs daily pending blood work results  Labs ordered  Orders:    Thyroid Stimulating Hormone; Future    Primary hypertension  Home bp 120/70-80  /180 today  Continue current dose lisinopril 10 mg daily  Orders:    Comprehensive Metabolic Panel; Future    Hyperlipidemia, unspecified hyperlipidemia type  Now taking 20 mg atorvastatin most nights  Atherosclerotic Calcifications of Abdominal Aorta noted on CT Abd/Pelvis 1/17/2024 at Clinic  Will recheck fx lipids  Declined CT cardiac score  Orders:    " Lipid Panel; Future    Comprehensive Metabolic Panel; Future    Exudative age-related macular degeneration of right eye, unspecified stage  Eye exams current  Follows with ophthalmology  Takes [facility]  Prior influenza vaccine in fall 2024  Prior Covid vaccine 8/2024   RSV 11/25/2023  Prevnar 13 in 12/2016 and PPSV23/1/7/2019, recommend Prevnar 21  Had zostavax 2010, got Shingrix 1/7/2019 and reprots second Shingrix but no record  DXA 12/2023 normal at spine, osteopenia at hip  MAW due August       Osteopenia of right hip  DXA 12/2023 normal at spine, Osteopenia in hip  Foot fracture in 2010  On fosamax since 11/2021 to complete 5 yrs            Hortensia Pierre, DO

## 2025-02-13 LAB
ALBUMIN SERPL-MCNC: 4 G/DL (ref 3.6–5.1)
ALP SERPL-CCNC: 71 U/L (ref 37–153)
ALT SERPL-CCNC: 12 U/L (ref 6–29)
ANION GAP SERPL CALCULATED.4IONS-SCNC: 9 MMOL/L (CALC) (ref 7–17)
AST SERPL-CCNC: 16 U/L (ref 10–35)
BILIRUB SERPL-MCNC: 0.6 MG/DL (ref 0.2–1.2)
BUN SERPL-MCNC: 13 MG/DL (ref 7–25)
CALCIUM SERPL-MCNC: 9.6 MG/DL (ref 8.6–10.4)
CHLORIDE SERPL-SCNC: 105 MMOL/L (ref 98–110)
CHOLEST SERPL-MCNC: 165 MG/DL
CHOLEST/HDLC SERPL: 3.1 (CALC)
CO2 SERPL-SCNC: 26 MMOL/L (ref 20–32)
CREAT SERPL-MCNC: 0.75 MG/DL (ref 0.6–0.95)
EGFRCR SERPLBLD CKD-EPI 2021: 78 ML/MIN/1.73M2
GLUCOSE SERPL-MCNC: 93 MG/DL (ref 65–99)
HDLC SERPL-MCNC: 54 MG/DL
LDLC SERPL CALC-MCNC: 90 MG/DL (CALC)
NONHDLC SERPL-MCNC: 111 MG/DL (CALC)
POTASSIUM SERPL-SCNC: 4.3 MMOL/L (ref 3.5–5.3)
PROT SERPL-MCNC: 6.7 G/DL (ref 6.1–8.1)
SODIUM SERPL-SCNC: 140 MMOL/L (ref 135–146)
TRIGL SERPL-MCNC: 109 MG/DL
TSH SERPL-ACNC: 2.6 MIU/L (ref 0.4–4.5)

## 2025-03-11 NOTE — ASSESSMENT & PLAN NOTE
DXA 12/2023 normal at spine, Osteopenia in hip  Foot fracture in 2010  On fosamax since 11/2021 to complete 5 yrs

## 2025-04-07 ENCOUNTER — OFFICE VISIT (OUTPATIENT)
Dept: SURGERY | Facility: CLINIC | Age: 85
End: 2025-04-07
Payer: MEDICARE

## 2025-04-07 VITALS
WEIGHT: 129 LBS | TEMPERATURE: 96.8 F | DIASTOLIC BLOOD PRESSURE: 81 MMHG | SYSTOLIC BLOOD PRESSURE: 145 MMHG | HEIGHT: 63 IN | HEART RATE: 92 BPM | BODY MASS INDEX: 22.86 KG/M2

## 2025-04-07 DIAGNOSIS — K64.8 PROLAPSED INTERNAL HEMORRHOIDS: Primary | ICD-10-CM

## 2025-04-07 DIAGNOSIS — K59.00 CONSTIPATION, UNSPECIFIED CONSTIPATION TYPE: ICD-10-CM

## 2025-04-07 PROCEDURE — 1159F MED LIST DOCD IN RCRD: CPT | Performed by: NURSE PRACTITIONER

## 2025-04-07 PROCEDURE — 3079F DIAST BP 80-89 MM HG: CPT | Performed by: NURSE PRACTITIONER

## 2025-04-07 PROCEDURE — 1157F ADVNC CARE PLAN IN RCRD: CPT | Performed by: NURSE PRACTITIONER

## 2025-04-07 PROCEDURE — 99203 OFFICE O/P NEW LOW 30 MIN: CPT | Performed by: NURSE PRACTITIONER

## 2025-04-07 PROCEDURE — 99213 OFFICE O/P EST LOW 20 MIN: CPT | Mod: 25 | Performed by: NURSE PRACTITIONER

## 2025-04-07 PROCEDURE — 1126F AMNT PAIN NOTED NONE PRSNT: CPT | Performed by: NURSE PRACTITIONER

## 2025-04-07 PROCEDURE — 46221 LIGATION OF HEMORRHOID(S): CPT | Performed by: NURSE PRACTITIONER

## 2025-04-07 PROCEDURE — 3077F SYST BP >= 140 MM HG: CPT | Performed by: NURSE PRACTITIONER

## 2025-04-07 RX ORDER — AMOXICILLIN 500 MG/1
CAPSULE ORAL
COMMUNITY
Start: 2024-10-28 | End: 2025-04-08 | Stop reason: WASHOUT

## 2025-04-07 RX ORDER — PREDNISONE 20 MG/1
TABLET ORAL
COMMUNITY
Start: 2023-11-02 | End: 2025-04-08 | Stop reason: WASHOUT

## 2025-04-07 ASSESSMENT — PAIN SCALES - GENERAL: PAINLEVEL_OUTOF10: 0-NO PAIN

## 2025-04-07 NOTE — PROGRESS NOTES
"History Of Present Illness  Maryanne Gee \"Kendall" is a 84 y.o. female presenting with a prolapsing hemorrhoid.    She had a rubber band ligation over 3 years ago for prolapsing hemorrhoids and that worked well until now.      She noticed a prolapsing hemorrhoid a few months ago.  She is having difficulty in keeping clean d/t the hemorrhoid.  No c/o any soreness or any bleeding to the hemorrhoid.      She will have 1-2 soft BM's every day.  No c/o any straining or sitting long on the toilet to have a BM.  She is not taking any fiber supplements or Colace.      Colonoscopy about 12 years ago and negative per pt      Past Medical History  She has a past medical history of Acute upper respiratory infection, unspecified (03/20/2019), Carpal tunnel syndrome, left upper limb (08/02/2016), Chronic sinusitis, unspecified (03/25/2019), Cough, unspecified (11/06/2014), Difficulty in walking, not elsewhere classified (03/23/2018), Encounter for full-term uncomplicated delivery, Encounter for immunization (01/07/2019), Encounter for immunization (01/07/2019), Encounter for other preprocedural examination (07/12/2016), Encounter for screening for osteoporosis (04/14/2021), Encounter for screening mammogram for malignant neoplasm of breast, [place] (10/28/2014), Hemorrhage of anus and rectum (12/22/2020), Osteoarthritis of knee, unspecified (06/21/2018), Other conditions influencing health status (10/22/2013), Other specified disorders of bladder (03/04/2013), Pain in right foot (08/13/2017), Pain in right foot (11/03/2015), Pain in unspecified knee (03/23/2018), Personal history of diseases of the blood and blood-forming organs and certain disorders involving the immune mechanism (04/17/2013), Personal history of other diseases of the circulatory system (03/04/2013), Personal history of other diseases of the digestive system (12/18/2017), Personal history of other diseases of the digestive system (03/04/2013), Personal history " of other diseases of the musculoskeletal system and connective tissue (03/04/2013), Personal history of other diseases of the nervous system and sense organs (10/08/2014), Personal history of other diseases of the nervous system and sense organs (07/12/2016), Personal history of other diseases of the nervous system and sense organs (10/08/2014), Personal history of other diseases of the respiratory system (10/02/2017), Personal history of other diseases of the respiratory system (04/26/2022), Personal history of other drug therapy (07/21/2014), Personal history of other endocrine, nutritional and metabolic disease (04/17/2013), Personal history of other specified conditions (03/04/2013), Personal history of urinary (tract) infections (12/07/2019), Postpartum depression (09/26/2022), Pure hypercholesterolemia, unspecified (04/17/2013), Rectocele (03/04/2013), and Unspecified chronic otitis externa, unspecified ear (10/08/2014).    Surgical History  She has a past surgical history that includes Hysterectomy (03/04/2013); Tubal ligation (03/04/2013); Knee Arthroplasty (06/26/2020); Other surgical history (09/26/2022); Other surgical history (Bilateral, 01/06/2020); Other surgical history (01/06/2020); Hernia repair (08/26/2022); Other surgical history (05/14/2021); Knee Arthroplasty (04/17/2013); and Breast cyst aspiration.     Social History  She reports that she quit smoking about 55 years ago. Her smoking use included cigarettes. She started smoking about 75 years ago. She has a 20 pack-year smoking history. She has never used smokeless tobacco. She reports current alcohol use of about 2.0 standard drinks of alcohol per week. She reports that she does not use drugs.    Family History  Family History   Problem Relation Name Age of Onset    Other (cva) Mother          95    Heart attack Father          age 51    Leukemia Sister      Heart failure Sister          other sister    Pulmonary fibrosis Sister      Multiple  myeloma Brother          Allergies  Tetanus vaccines and toxoid    Review of Systems   All other systems reviewed and are negative.       Physical Exam  Exam conducted with a chaperone present.   Constitutional:       Appearance: Normal appearance.   HENT:      Head: Normocephalic and atraumatic.   Pulmonary:      Effort: Pulmonary effort is normal.   Musculoskeletal:         General: Normal range of motion.   Skin:     General: Skin is warm and dry.   Neurological:      General: No focal deficit present.      Mental Status: She is alert and oriented to person, place, and time.   Psychiatric:         Mood and Affect: Mood normal.         Behavior: Behavior normal.          General    Date/Time: 4/7/2025 7:55 AM    Performed by: LAURA Puri  Authorized by: LAURA Puri    Consent:     Consent obtained:  Written    Consent given by:  Patient    Risks discussed:  Bleeding, infection and pain  Sedation:     Sedation type:  None  Anesthesia:     Anesthesia method:  None  Procedure specific details:      She has 2 small non-inflamed anal skin tags.  No pain on SIM, slight weak tone.  On anoscopy, looking in 360 degrees, she has prolapsing internal hemorrhoids and I was able to placed double rubber bands over the hemorrhoids in the right posterior and anterior and left lateral positions.  No active bleeding  Post-procedure details:     Procedure completion:  Tolerated    Last Recorded Vitals  /81   Pulse 92   Temp 36 °C (96.8 °F) (Temporal)   Wt 58.5 kg (129 lb)      Assessment/Plan   May tolerated the rubber band ligation well today.  I was able to place double rubber bands over the hemorrhoids in the right posterior and anterior and left lateral positions.  She will continue to increase her fiber and water intake and start taking Benefiber daily.  She will call with any issues and will follow up in 6 weeks if not better.         LAURA Puri

## 2025-06-03 DIAGNOSIS — E78.5 HYPERLIPIDEMIA, UNSPECIFIED HYPERLIPIDEMIA TYPE: Primary | ICD-10-CM

## 2025-06-03 RX ORDER — ROSUVASTATIN CALCIUM 20 MG/1
20 TABLET, COATED ORAL DAILY
Qty: 100 TABLET | Refills: 0 | Status: SHIPPED | OUTPATIENT
Start: 2025-06-03 | End: 2026-07-08

## 2025-06-11 DIAGNOSIS — M85.851 OSTEOPENIA OF RIGHT HIP: ICD-10-CM

## 2025-06-12 RX ORDER — ALENDRONATE SODIUM 70 MG/1
TABLET ORAL
Qty: 12 TABLET | Refills: 1 | OUTPATIENT
Start: 2025-06-12

## 2025-06-27 ENCOUNTER — OFFICE VISIT (OUTPATIENT)
Dept: PRIMARY CARE | Facility: CLINIC | Age: 85
End: 2025-06-27
Payer: MEDICARE

## 2025-06-27 VITALS
DIASTOLIC BLOOD PRESSURE: 69 MMHG | HEIGHT: 62 IN | HEART RATE: 86 BPM | WEIGHT: 132 LBS | OXYGEN SATURATION: 96 % | BODY MASS INDEX: 24.29 KG/M2 | SYSTOLIC BLOOD PRESSURE: 110 MMHG

## 2025-06-27 DIAGNOSIS — M79.604 PAIN OF RIGHT LOWER EXTREMITY: Primary | ICD-10-CM

## 2025-06-27 PROCEDURE — G2211 COMPLEX E/M VISIT ADD ON: HCPCS | Performed by: SPECIALIST

## 2025-06-27 PROCEDURE — 1159F MED LIST DOCD IN RCRD: CPT | Performed by: SPECIALIST

## 2025-06-27 PROCEDURE — 1160F RVW MEDS BY RX/DR IN RCRD: CPT | Performed by: SPECIALIST

## 2025-06-27 PROCEDURE — 99213 OFFICE O/P EST LOW 20 MIN: CPT | Performed by: SPECIALIST

## 2025-06-27 PROCEDURE — 1125F AMNT PAIN NOTED PAIN PRSNT: CPT | Performed by: SPECIALIST

## 2025-06-27 PROCEDURE — 3074F SYST BP LT 130 MM HG: CPT | Performed by: SPECIALIST

## 2025-06-27 PROCEDURE — 3078F DIAST BP <80 MM HG: CPT | Performed by: SPECIALIST

## 2025-06-27 ASSESSMENT — PATIENT HEALTH QUESTIONNAIRE - PHQ9
1. LITTLE INTEREST OR PLEASURE IN DOING THINGS: NOT AT ALL
SUM OF ALL RESPONSES TO PHQ9 QUESTIONS 1 AND 2: 0
2. FEELING DOWN, DEPRESSED OR HOPELESS: NOT AT ALL

## 2025-06-27 ASSESSMENT — ENCOUNTER SYMPTOMS
DEPRESSION: 0
LOSS OF SENSATION IN FEET: 0
OCCASIONAL FEELINGS OF UNSTEADINESS: 1

## 2025-06-27 ASSESSMENT — PAIN SCALES - GENERAL: PAINLEVEL_OUTOF10: 8
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
## 2025-07-09 ENCOUNTER — TELEPHONE (OUTPATIENT)
Dept: PRIMARY CARE | Facility: CLINIC | Age: 85
End: 2025-07-09
Payer: MEDICARE

## 2025-07-09 DIAGNOSIS — E03.9 HYPOTHYROIDISM, UNSPECIFIED TYPE: Primary | ICD-10-CM

## 2025-07-09 DIAGNOSIS — M89.8X5 PAIN OF LEFT FEMUR: ICD-10-CM

## 2025-07-09 DIAGNOSIS — I10 HYPERTENSION, UNSPECIFIED TYPE: ICD-10-CM

## 2025-07-09 DIAGNOSIS — E78.5 HYPERLIPIDEMIA, UNSPECIFIED HYPERLIPIDEMIA TYPE: ICD-10-CM

## 2025-07-09 DIAGNOSIS — R10.31 RIGHT INGUINAL PAIN: ICD-10-CM

## 2025-07-09 RX ORDER — ESTRADIOL 10 UG/1
10 TABLET, FILM COATED VAGINAL 2 TIMES WEEKLY
COMMUNITY

## 2025-07-09 NOTE — TELEPHONE ENCOUNTER
Pt called the office and left a vm 7/8/25 stating that she's still having pain in her right leg and she feels it most when she really puts weight on the right side of her body.  Pt wanted to speak with concerning this issue.  The pt's last ov was 6/27/25.

## 2025-07-14 ENCOUNTER — HOSPITAL ENCOUNTER (OUTPATIENT)
Dept: RADIOLOGY | Facility: CLINIC | Age: 85
Discharge: HOME | End: 2025-07-14
Payer: MEDICARE

## 2025-07-14 DIAGNOSIS — M89.8X5 PAIN OF LEFT FEMUR: ICD-10-CM

## 2025-07-14 DIAGNOSIS — R10.31 RIGHT INGUINAL PAIN: ICD-10-CM

## 2025-07-14 PROCEDURE — 73552 X-RAY EXAM OF FEMUR 2/>: CPT | Mod: RT

## 2025-07-14 PROCEDURE — 73552 X-RAY EXAM OF FEMUR 2/>: CPT | Mod: RIGHT SIDE | Performed by: RADIOLOGY

## 2025-07-14 PROCEDURE — 73502 X-RAY EXAM HIP UNI 2-3 VIEWS: CPT | Mod: RIGHT SIDE | Performed by: RADIOLOGY

## 2025-07-14 PROCEDURE — 73502 X-RAY EXAM HIP UNI 2-3 VIEWS: CPT | Mod: RT

## 2025-07-25 DIAGNOSIS — E78.5 HYPERLIPIDEMIA, UNSPECIFIED HYPERLIPIDEMIA TYPE: ICD-10-CM

## 2025-07-25 DIAGNOSIS — E03.9 HYPOTHYROIDISM, UNSPECIFIED TYPE: ICD-10-CM

## 2025-07-29 RX ORDER — ROSUVASTATIN CALCIUM 20 MG/1
20 TABLET, COATED ORAL DAILY
Qty: 100 TABLET | Refills: 0 | Status: SHIPPED | OUTPATIENT
Start: 2025-07-29

## 2025-07-29 RX ORDER — LEVOTHYROXINE SODIUM 100 UG/1
TABLET ORAL
Qty: 94 TABLET | Refills: 0 | Status: SHIPPED | OUTPATIENT
Start: 2025-07-29

## 2025-08-01 LAB
ALBUMIN SERPL-MCNC: 4.1 G/DL (ref 3.6–5.1)
ALP SERPL-CCNC: 57 U/L (ref 37–153)
ALT SERPL-CCNC: 12 U/L (ref 6–29)
ANION GAP SERPL CALCULATED.4IONS-SCNC: 8 MMOL/L (CALC) (ref 7–17)
AST SERPL-CCNC: 15 U/L (ref 10–35)
BILIRUB SERPL-MCNC: 0.5 MG/DL (ref 0.2–1.2)
BUN SERPL-MCNC: 18 MG/DL (ref 7–25)
CALCIUM SERPL-MCNC: 9.7 MG/DL (ref 8.6–10.4)
CHLORIDE SERPL-SCNC: 108 MMOL/L (ref 98–110)
CHOLEST SERPL-MCNC: 144 MG/DL
CHOLEST/HDLC SERPL: 2.4 (CALC)
CK SERPL-CCNC: 82 U/L (ref 16–215)
CO2 SERPL-SCNC: 24 MMOL/L (ref 20–32)
CREAT SERPL-MCNC: 0.67 MG/DL (ref 0.6–0.95)
EGFRCR SERPLBLD CKD-EPI 2021: 85 ML/MIN/1.73M2
ERYTHROCYTE [DISTWIDTH] IN BLOOD BY AUTOMATED COUNT: 12.9 % (ref 11–15)
ERYTHROCYTE [SEDIMENTATION RATE] IN BLOOD BY WESTERGREN METHOD: 11 MM/H
GLUCOSE SERPL-MCNC: 89 MG/DL (ref 65–99)
HCT VFR BLD AUTO: 39.7 % (ref 35–45)
HDLC SERPL-MCNC: 61 MG/DL
HGB BLD-MCNC: 13 G/DL (ref 11.7–15.5)
LDLC SERPL CALC-MCNC: 64 MG/DL (CALC)
MCH RBC QN AUTO: 29.8 PG (ref 27–33)
MCHC RBC AUTO-ENTMCNC: 32.7 G/DL (ref 32–36)
MCV RBC AUTO: 91.1 FL (ref 80–100)
NONHDLC SERPL-MCNC: 83 MG/DL (CALC)
PLATELET # BLD AUTO: 275 THOUSAND/UL (ref 140–400)
PMV BLD REES-ECKER: 10.2 FL (ref 7.5–12.5)
POTASSIUM SERPL-SCNC: 4.2 MMOL/L (ref 3.5–5.3)
PROT SERPL-MCNC: 6.8 G/DL (ref 6.1–8.1)
RBC # BLD AUTO: 4.36 MILLION/UL (ref 3.8–5.1)
SODIUM SERPL-SCNC: 140 MMOL/L (ref 135–146)
TRIGL SERPL-MCNC: 105 MG/DL
TSH SERPL-ACNC: 0.59 MIU/L (ref 0.4–4.5)
WBC # BLD AUTO: 5.4 THOUSAND/UL (ref 3.8–10.8)

## 2025-08-06 ENCOUNTER — APPOINTMENT (OUTPATIENT)
Dept: PRIMARY CARE | Facility: CLINIC | Age: 85
End: 2025-08-06
Payer: MEDICARE

## 2025-08-06 VITALS
SYSTOLIC BLOOD PRESSURE: 130 MMHG | HEART RATE: 87 BPM | HEIGHT: 62 IN | WEIGHT: 132 LBS | DIASTOLIC BLOOD PRESSURE: 80 MMHG | BODY MASS INDEX: 24.29 KG/M2 | OXYGEN SATURATION: 97 %

## 2025-08-06 DIAGNOSIS — Z12.31 ENCOUNTER FOR SCREENING MAMMOGRAM FOR MALIGNANT NEOPLASM OF BREAST: ICD-10-CM

## 2025-08-06 DIAGNOSIS — Z66 DNR (DO NOT RESUSCITATE): ICD-10-CM

## 2025-08-06 DIAGNOSIS — E78.5 HYPERLIPIDEMIA, UNSPECIFIED HYPERLIPIDEMIA TYPE: ICD-10-CM

## 2025-08-06 DIAGNOSIS — I10 PRIMARY HYPERTENSION: ICD-10-CM

## 2025-08-06 DIAGNOSIS — E03.9 HYPOTHYROIDISM, UNSPECIFIED TYPE: ICD-10-CM

## 2025-08-06 DIAGNOSIS — Z71.89 GOALS OF CARE, COUNSELING/DISCUSSION: ICD-10-CM

## 2025-08-06 DIAGNOSIS — Z00.00 MEDICARE ANNUAL WELLNESS VISIT, SUBSEQUENT: Primary | ICD-10-CM

## 2025-08-06 PROCEDURE — 99397 PER PM REEVAL EST PAT 65+ YR: CPT | Performed by: SPECIALIST

## 2025-08-06 PROCEDURE — 3078F DIAST BP <80 MM HG: CPT | Performed by: SPECIALIST

## 2025-08-06 PROCEDURE — G0439 PPPS, SUBSEQ VISIT: HCPCS | Performed by: SPECIALIST

## 2025-08-06 PROCEDURE — 1126F AMNT PAIN NOTED NONE PRSNT: CPT | Performed by: SPECIALIST

## 2025-08-06 PROCEDURE — 1158F ADVNC CARE PLAN TLK DOCD: CPT | Performed by: SPECIALIST

## 2025-08-06 PROCEDURE — 1170F FXNL STATUS ASSESSED: CPT | Performed by: SPECIALIST

## 2025-08-06 PROCEDURE — 1160F RVW MEDS BY RX/DR IN RCRD: CPT | Performed by: SPECIALIST

## 2025-08-06 PROCEDURE — 3074F SYST BP LT 130 MM HG: CPT | Performed by: SPECIALIST

## 2025-08-06 PROCEDURE — 1159F MED LIST DOCD IN RCRD: CPT | Performed by: SPECIALIST

## 2025-08-06 PROCEDURE — 1123F ACP DISCUSS/DSCN MKR DOCD: CPT | Performed by: SPECIALIST

## 2025-08-06 ASSESSMENT — PAIN SCALES - GENERAL: PAINLEVEL_OUTOF10: 0-NO PAIN

## 2025-08-06 ASSESSMENT — ACTIVITIES OF DAILY LIVING (ADL)
DRESSING: INDEPENDENT
DRESSING: INDEPENDENT
MANAGING_FINANCES: INDEPENDENT
GROCERY_SHOPPING: INDEPENDENT
MANAGING_FINANCES: INDEPENDENT
BATHING: INDEPENDENT
BATHING: INDEPENDENT
TAKING_MEDICATION: INDEPENDENT
DOING_HOUSEWORK: INDEPENDENT
GROCERY_SHOPPING: INDEPENDENT
DOING_HOUSEWORK: INDEPENDENT
TAKING_MEDICATION: INDEPENDENT

## 2025-08-06 ASSESSMENT — PATIENT HEALTH QUESTIONNAIRE - PHQ9
2. FEELING DOWN, DEPRESSED OR HOPELESS: NOT AT ALL
2. FEELING DOWN, DEPRESSED OR HOPELESS: NOT AT ALL
SUM OF ALL RESPONSES TO PHQ9 QUESTIONS 1 AND 2: 0
1. LITTLE INTEREST OR PLEASURE IN DOING THINGS: NOT AT ALL
SUM OF ALL RESPONSES TO PHQ9 QUESTIONS 1 AND 2: 0
1. LITTLE INTEREST OR PLEASURE IN DOING THINGS: NOT AT ALL

## 2025-08-06 ASSESSMENT — ENCOUNTER SYMPTOMS
DEPRESSION: 0
LOSS OF SENSATION IN FEET: 1
OCCASIONAL FEELINGS OF UNSTEADINESS: 0

## 2025-08-15 ENCOUNTER — OFFICE VISIT (OUTPATIENT)
Dept: URGENT CARE | Age: 85
End: 2025-08-15
Payer: MEDICARE

## 2025-08-15 VITALS
WEIGHT: 132 LBS | DIASTOLIC BLOOD PRESSURE: 64 MMHG | HEART RATE: 80 BPM | OXYGEN SATURATION: 98 % | TEMPERATURE: 98.3 F | BODY MASS INDEX: 24.29 KG/M2 | SYSTOLIC BLOOD PRESSURE: 115 MMHG | HEIGHT: 62 IN

## 2025-08-15 DIAGNOSIS — H61.21 IMPACTED CERUMEN OF RIGHT EAR: Primary | ICD-10-CM

## 2025-08-28 ENCOUNTER — APPOINTMENT (OUTPATIENT)
Dept: DERMATOLOGY | Facility: CLINIC | Age: 85
End: 2025-08-28
Payer: MEDICARE

## 2025-08-28 DIAGNOSIS — L57.8 DIFFUSE PHOTODAMAGE OF SKIN: ICD-10-CM

## 2025-08-28 DIAGNOSIS — L57.0 ACTINIC KERATOSIS: ICD-10-CM

## 2025-08-28 DIAGNOSIS — D48.5 NEOPLASM OF UNCERTAIN BEHAVIOR OF SKIN: Primary | ICD-10-CM

## 2025-08-28 DIAGNOSIS — L82.0 INFLAMED SEBORRHEIC KERATOSIS: ICD-10-CM

## 2025-08-28 DIAGNOSIS — L82.1 SEBORRHEIC KERATOSIS: ICD-10-CM

## 2025-08-28 DIAGNOSIS — D22.5 MELANOCYTIC NEVUS OF TRUNK: ICD-10-CM

## 2025-08-28 DIAGNOSIS — B35.3 TINEA PEDIS OF LEFT FOOT: ICD-10-CM

## 2025-08-28 DIAGNOSIS — D18.01 HEMANGIOMA OF SKIN: ICD-10-CM

## 2025-08-28 DIAGNOSIS — B35.3 TINEA PEDIS OF RIGHT FOOT: ICD-10-CM

## 2025-08-28 PROCEDURE — 17003 DESTRUCT PREMALG LES 2-14: CPT | Performed by: DERMATOLOGY

## 2025-08-28 PROCEDURE — 1159F MED LIST DOCD IN RCRD: CPT | Performed by: DERMATOLOGY

## 2025-08-28 PROCEDURE — 17000 DESTRUCT PREMALG LESION: CPT | Performed by: DERMATOLOGY

## 2025-08-28 PROCEDURE — 99204 OFFICE O/P NEW MOD 45 MIN: CPT | Performed by: DERMATOLOGY

## 2025-08-28 PROCEDURE — 17110 DESTRUCTION B9 LES UP TO 14: CPT | Performed by: DERMATOLOGY

## 2025-08-28 PROCEDURE — 11102 TANGNTL BX SKIN SINGLE LES: CPT | Performed by: DERMATOLOGY

## 2025-08-28 RX ORDER — KETOCONAZOLE 20 MG/G
CREAM TOPICAL 2 TIMES DAILY
Qty: 60 G | Refills: 11 | Status: SHIPPED | OUTPATIENT
Start: 2025-08-28

## 2025-08-28 ASSESSMENT — DERMATOLOGY QUALITY OF LIFE (QOL) ASSESSMENT
RATE HOW BOTHERED YOU ARE BY EFFECTS OF YOUR SKIN PROBLEMS ON YOUR ACTIVITIES (EG, GOING OUT, ACCOMPLISHING WHAT YOU WANT, WORK ACTIVITIES OR YOUR RELATIONSHIPS WITH OTHERS): 0 - NEVER BOTHERED
DATE THE QUALITY-OF-LIFE ASSESSMENT WAS COMPLETED: 67445
WHAT SINGLE SKIN CONDITION LISTED BELOW IS THE PATIENT ANSWERING THE QUALITY-OF-LIFE ASSESSMENT QUESTIONS ABOUT: NONE OF THE ABOVE
ARE THERE EXCLUSIONS OR EXCEPTIONS FOR THE QUALITY OF LIFE ASSESSMENT: NO
RATE HOW EMOTIONALLY BOTHERED YOU ARE BY YOUR SKIN PROBLEM (FOR EXAMPLE, WORRY, EMBARRASSMENT, FRUSTRATION): 0 - NEVER BOTHERED

## 2025-08-28 ASSESSMENT — DERMATOLOGY PATIENT ASSESSMENT
HAVE YOU HAD OR DO YOU HAVE VASCULAR DISEASE: NO
ARE YOU TRYING TO GET PREGNANT: NO
HAVE YOU HAD OR DO YOU HAVE A STAPH INFECTION: NO
DO YOU USE SUNSCREEN: OCCASIONALLY
DO YOU HAVE ANY NEW OR CHANGING LESIONS: NO
DO YOU USE A TANNING BED: NO
ARE YOU AN ORGAN TRANSPLANT RECIPIENT: NO
DO YOU HAVE IRREGULAR MENSTRUAL CYCLES: NO
ARE YOU ON BIRTH CONTROL: NO

## 2025-08-28 ASSESSMENT — PATIENT GLOBAL ASSESSMENT (PGA): PATIENT GLOBAL ASSESSMENT: PATIENT GLOBAL ASSESSMENT:  1 - CLEAR

## 2025-09-02 LAB
LABORATORY COMMENT REPORT: NORMAL
PATH REPORT.FINAL DX SPEC: NORMAL
PATH REPORT.GROSS SPEC: NORMAL
PATH REPORT.MICROSCOPIC SPEC OTHER STN: NORMAL
PATH REPORT.RELEVANT HX SPEC: NORMAL
PATH REPORT.TOTAL CANCER: NORMAL

## 2025-10-20 ENCOUNTER — APPOINTMENT (OUTPATIENT)
Dept: PRIMARY CARE | Facility: CLINIC | Age: 85
End: 2025-10-20
Payer: MEDICARE

## 2025-12-03 ENCOUNTER — APPOINTMENT (OUTPATIENT)
Dept: DERMATOLOGY | Facility: CLINIC | Age: 85
End: 2025-12-03
Payer: MEDICARE

## 2026-02-05 ENCOUNTER — APPOINTMENT (OUTPATIENT)
Dept: CARDIOLOGY | Facility: HOSPITAL | Age: 86
End: 2026-02-05
Payer: MEDICARE